# Patient Record
Sex: MALE | Race: WHITE
[De-identification: names, ages, dates, MRNs, and addresses within clinical notes are randomized per-mention and may not be internally consistent; named-entity substitution may affect disease eponyms.]

---

## 2017-01-23 PROBLEM — Z00.00 ENCOUNTER FOR PREVENTIVE HEALTH EXAMINATION: Status: ACTIVE | Noted: 2017-01-23

## 2017-02-13 ENCOUNTER — APPOINTMENT (OUTPATIENT)
Dept: HEMATOLOGY ONCOLOGY | Facility: CLINIC | Age: 67
End: 2017-02-13

## 2017-06-18 ENCOUNTER — OUTPATIENT (OUTPATIENT)
Dept: OUTPATIENT SERVICES | Facility: HOSPITAL | Age: 67
LOS: 1 days | Discharge: HOME | End: 2017-06-18

## 2017-06-28 DIAGNOSIS — R51 HEADACHE: ICD-10-CM

## 2017-08-15 ENCOUNTER — APPOINTMENT (OUTPATIENT)
Dept: SPINE | Facility: CLINIC | Age: 67
End: 2017-08-15
Payer: COMMERCIAL

## 2017-08-15 VITALS
HEIGHT: 69 IN | HEART RATE: 54 BPM | WEIGHT: 205 LBS | OXYGEN SATURATION: 96 % | SYSTOLIC BLOOD PRESSURE: 94 MMHG | DIASTOLIC BLOOD PRESSURE: 61 MMHG | BODY MASS INDEX: 30.36 KG/M2

## 2017-08-15 DIAGNOSIS — Z86.69 PERSONAL HISTORY OF OTHER DISEASES OF THE NERVOUS SYSTEM AND SENSE ORGANS: ICD-10-CM

## 2017-08-15 DIAGNOSIS — Z86.39 PERSONAL HISTORY OF OTHER ENDOCRINE, NUTRITIONAL AND METABOLIC DISEASE: ICD-10-CM

## 2017-08-15 DIAGNOSIS — M47.812 SPONDYLOSIS W/OUT MYELOPATHY OR RADICULOPATHY, CERVICAL REGION: ICD-10-CM

## 2017-08-15 DIAGNOSIS — G95.20 UNSPECIFIED CORD COMPRESSION: ICD-10-CM

## 2017-08-15 DIAGNOSIS — M25.78 OSTEOPHYTE, VERTEBRAE: ICD-10-CM

## 2017-08-15 DIAGNOSIS — Z95.5 PRESENCE OF CORONARY ANGIOPLASTY IMPLANT AND GRAFT: ICD-10-CM

## 2017-08-15 DIAGNOSIS — Z82.61 FAMILY HISTORY OF ARTHRITIS: ICD-10-CM

## 2017-08-15 DIAGNOSIS — R26.9 UNSPECIFIED ABNORMALITIES OF GAIT AND MOBILITY: ICD-10-CM

## 2017-08-15 DIAGNOSIS — Z87.39 PERSONAL HISTORY OF OTHER DISEASES OF THE MUSCULOSKELETAL SYSTEM AND CONNECTIVE TISSUE: ICD-10-CM

## 2017-08-15 DIAGNOSIS — M48.02 SPINAL STENOSIS, CERVICAL REGION: ICD-10-CM

## 2017-08-15 DIAGNOSIS — Z78.9 OTHER SPECIFIED HEALTH STATUS: ICD-10-CM

## 2017-08-15 DIAGNOSIS — Z86.711 PERSONAL HISTORY OF PULMONARY EMBOLISM: ICD-10-CM

## 2017-08-15 DIAGNOSIS — Z82.3 FAMILY HISTORY OF STROKE: ICD-10-CM

## 2017-08-15 DIAGNOSIS — Z87.438 PERSONAL HISTORY OF OTHER DISEASES OF MALE GENITAL ORGANS: ICD-10-CM

## 2017-08-15 DIAGNOSIS — Z87.19 PERSONAL HISTORY OF OTHER DISEASES OF THE DIGESTIVE SYSTEM: ICD-10-CM

## 2017-08-15 DIAGNOSIS — Z82.49 FAMILY HISTORY OF ISCHEMIC HEART DISEASE AND OTHER DISEASES OF THE CIRCULATORY SYSTEM: ICD-10-CM

## 2017-08-15 DIAGNOSIS — Z80.9 FAMILY HISTORY OF MALIGNANT NEOPLASM, UNSPECIFIED: ICD-10-CM

## 2017-08-15 PROCEDURE — 99204 OFFICE O/P NEW MOD 45 MIN: CPT

## 2017-08-18 PROBLEM — Z87.438 HISTORY OF BPH: Status: RESOLVED | Noted: 2017-08-18 | Resolved: 2017-08-18

## 2017-08-18 PROBLEM — Z86.69 HISTORY OF SLEEP APNEA: Status: RESOLVED | Noted: 2017-08-18 | Resolved: 2017-08-18

## 2017-08-18 PROBLEM — M48.02 LATERAL RECESS STENOSIS OF CERVICAL SPINE: Status: ACTIVE | Noted: 2017-08-18

## 2017-08-18 PROBLEM — Z80.9 FAMILY HISTORY OF MALIGNANT NEOPLASM: Status: ACTIVE | Noted: 2017-08-15

## 2017-08-18 PROBLEM — M47.812 SPONDYLOSIS, CERVICAL: Status: ACTIVE | Noted: 2017-08-18

## 2017-08-18 PROBLEM — Z86.711 HISTORY OF PULMONARY EMBOLISM: Status: RESOLVED | Noted: 2017-08-18 | Resolved: 2017-08-18

## 2017-08-18 PROBLEM — M25.78 CERVICAL OSTEOPHYTE: Status: ACTIVE | Noted: 2017-08-18

## 2017-08-18 PROBLEM — Z82.61 FAMILY HISTORY OF ARTHRITIS: Status: ACTIVE | Noted: 2017-08-15

## 2017-08-18 PROBLEM — Z86.39 HISTORY OF HYPERLIPIDEMIA: Status: RESOLVED | Noted: 2017-08-18 | Resolved: 2017-08-18

## 2017-08-18 PROBLEM — Z87.19 HISTORY OF GASTRITIS: Status: RESOLVED | Noted: 2017-08-18 | Resolved: 2017-08-18

## 2017-08-18 PROBLEM — G95.20 CERVICAL SPINAL CORD COMPRESSION: Status: ACTIVE | Noted: 2017-08-18

## 2017-08-18 PROBLEM — Z95.5 HISTORY OF HEART ARTERY STENT: Status: RESOLVED | Noted: 2017-08-15 | Resolved: 2017-08-18

## 2017-08-18 PROBLEM — R26.9 GAIT DISTURBANCE: Status: ACTIVE | Noted: 2017-08-18

## 2017-08-18 PROBLEM — Z87.39 HISTORY OF ARTHRITIS: Status: RESOLVED | Noted: 2017-08-18 | Resolved: 2017-08-18

## 2017-08-18 PROBLEM — Z82.49 FAMILY HISTORY OF HYPERTENSION: Status: ACTIVE | Noted: 2017-08-15

## 2017-08-18 PROBLEM — Z82.3 FAMILY HISTORY OF CEREBROVASCULAR ACCIDENT (CVA): Status: ACTIVE | Noted: 2017-08-15

## 2017-08-18 PROBLEM — Z78.9 DOES NOT USE ILLICIT DRUGS: Status: ACTIVE | Noted: 2017-08-15

## 2017-08-18 RX ORDER — METOPROLOL SUCCINATE 100 MG/1
100 TABLET, EXTENDED RELEASE ORAL
Refills: 0 | Status: ACTIVE | COMMUNITY

## 2017-08-18 RX ORDER — RANITIDINE HCL 150 MG
150 CAPSULE ORAL
Refills: 0 | Status: ACTIVE | COMMUNITY

## 2017-08-18 RX ORDER — DEXTROMETHORPHAN 30 MG/5ML
30 SUSPENSION, EXTENDED RELEASE ORAL
Refills: 0 | Status: ACTIVE | COMMUNITY

## 2017-08-18 RX ORDER — GABAPENTIN 400 MG/1
400 CAPSULE ORAL
Refills: 0 | Status: ACTIVE | COMMUNITY

## 2017-08-18 RX ORDER — CLOPIDOGREL 75 MG/1
75 TABLET, FILM COATED ORAL
Refills: 0 | Status: ACTIVE | COMMUNITY

## 2017-08-18 RX ORDER — ASPIRIN 81 MG
81 TABLET,CHEWABLE ORAL
Refills: 0 | Status: ACTIVE | COMMUNITY

## 2017-08-18 RX ORDER — FINASTERIDE 5 MG/1
5 TABLET, FILM COATED ORAL
Refills: 0 | Status: ACTIVE | COMMUNITY

## 2017-08-18 RX ORDER — TAMSULOSIN HYDROCHLORIDE 0.4 MG/1
0.4 CAPSULE ORAL
Refills: 0 | Status: ACTIVE | COMMUNITY

## 2017-08-18 RX ORDER — APIXABAN 5 MG/1
5 TABLET, FILM COATED ORAL
Refills: 0 | Status: ACTIVE | COMMUNITY

## 2017-08-18 RX ORDER — ATORVASTATIN CALCIUM 80 MG/1
80 TABLET, FILM COATED ORAL
Refills: 0 | Status: ACTIVE | COMMUNITY

## 2018-04-19 ENCOUNTER — OUTPATIENT (OUTPATIENT)
Dept: OUTPATIENT SERVICES | Facility: HOSPITAL | Age: 68
LOS: 1 days | Discharge: HOME | End: 2018-04-19

## 2018-04-19 DIAGNOSIS — D68.59 OTHER PRIMARY THROMBOPHILIA: ICD-10-CM

## 2018-10-21 ENCOUNTER — EMERGENCY (EMERGENCY)
Facility: HOSPITAL | Age: 68
LOS: 0 days | Discharge: HOME | End: 2018-10-21
Attending: STUDENT IN AN ORGANIZED HEALTH CARE EDUCATION/TRAINING PROGRAM | Admitting: EMERGENCY MEDICINE

## 2018-10-21 VITALS
TEMPERATURE: 98 F | OXYGEN SATURATION: 98 % | RESPIRATION RATE: 18 BRPM | HEART RATE: 75 BPM | SYSTOLIC BLOOD PRESSURE: 136 MMHG | DIASTOLIC BLOOD PRESSURE: 80 MMHG

## 2018-10-21 VITALS
TEMPERATURE: 98 F | HEART RATE: 73 BPM | DIASTOLIC BLOOD PRESSURE: 72 MMHG | RESPIRATION RATE: 18 BRPM | SYSTOLIC BLOOD PRESSURE: 124 MMHG | OXYGEN SATURATION: 95 %
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
## 2018-10-21 DIAGNOSIS — I25.10 ATHEROSCLEROTIC HEART DISEASE OF NATIVE CORONARY ARTERY WITHOUT ANGINA PECTORIS: ICD-10-CM

## 2018-10-21 DIAGNOSIS — M25.552 PAIN IN LEFT HIP: ICD-10-CM

## 2018-10-21 DIAGNOSIS — M25.562 PAIN IN LEFT KNEE: ICD-10-CM
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
## 2018-10-21 DIAGNOSIS — Z79.02 LONG TERM (CURRENT) USE OF ANTITHROMBOTICS/ANTIPLATELETS: ICD-10-CM

## 2018-10-21 DIAGNOSIS — Z95.5 PRESENCE OF CORONARY ANGIOPLASTY IMPLANT AND GRAFT: ICD-10-CM

## 2018-10-21 DIAGNOSIS — M79.652 PAIN IN LEFT THIGH: ICD-10-CM

## 2018-10-21 DIAGNOSIS — I10 ESSENTIAL (PRIMARY) HYPERTENSION: ICD-10-CM

## 2018-10-21 DIAGNOSIS — M25.559 PAIN IN UNSPECIFIED HIP: ICD-10-CM

## 2018-10-21 DIAGNOSIS — Z79.01 LONG TERM (CURRENT) USE OF ANTICOAGULANTS: ICD-10-CM

## 2018-10-21 LAB
ALBUMIN SERPL ELPH-MCNC: 3.8 G/DL — SIGNIFICANT CHANGE UP (ref 3.5–5.2)
ALP SERPL-CCNC: 79 U/L — SIGNIFICANT CHANGE UP (ref 30–115)
ALT FLD-CCNC: 18 U/L — SIGNIFICANT CHANGE UP (ref 0–41)
ANION GAP SERPL CALC-SCNC: 14 MMOL/L — SIGNIFICANT CHANGE UP (ref 7–14)
APTT BLD: 34.4 SEC — SIGNIFICANT CHANGE UP (ref 27–39.2)
AST SERPL-CCNC: 17 U/L — SIGNIFICANT CHANGE UP (ref 0–41)
BASOPHILS # BLD AUTO: 0.07 K/UL — SIGNIFICANT CHANGE UP (ref 0–0.2)
BASOPHILS NFR BLD AUTO: 0.6 % — SIGNIFICANT CHANGE UP (ref 0–1)
BILIRUB SERPL-MCNC: 0.8 MG/DL — SIGNIFICANT CHANGE UP (ref 0.2–1.2)
BLD GP AB SCN SERPL QL: SIGNIFICANT CHANGE UP
BUN SERPL-MCNC: 18 MG/DL — SIGNIFICANT CHANGE UP (ref 10–20)
CALCIUM SERPL-MCNC: 8.7 MG/DL — SIGNIFICANT CHANGE UP (ref 8.5–10.1)
CHLORIDE SERPL-SCNC: 103 MMOL/L — SIGNIFICANT CHANGE UP (ref 98–110)
CO2 SERPL-SCNC: 24 MMOL/L — SIGNIFICANT CHANGE UP (ref 17–32)
CREAT SERPL-MCNC: 0.9 MG/DL — SIGNIFICANT CHANGE UP (ref 0.7–1.5)
EOSINOPHIL # BLD AUTO: 0.15 K/UL — SIGNIFICANT CHANGE UP (ref 0–0.7)
EOSINOPHIL NFR BLD AUTO: 1.3 % — SIGNIFICANT CHANGE UP (ref 0–8)
GLUCOSE SERPL-MCNC: 88 MG/DL — SIGNIFICANT CHANGE UP (ref 70–99)
HCT VFR BLD CALC: 48.6 % — SIGNIFICANT CHANGE UP (ref 42–52)
HGB BLD-MCNC: 16.2 G/DL — SIGNIFICANT CHANGE UP (ref 14–18)
IMM GRANULOCYTES NFR BLD AUTO: 1.2 % — HIGH (ref 0.1–0.3)
INR BLD: 1.18 RATIO — SIGNIFICANT CHANGE UP (ref 0.65–1.3)
LYMPHOCYTES # BLD AUTO: 1.66 K/UL — SIGNIFICANT CHANGE UP (ref 1.2–3.4)
LYMPHOCYTES # BLD AUTO: 14.7 % — LOW (ref 20.5–51.1)
MCHC RBC-ENTMCNC: 32.1 PG — HIGH (ref 27–31)
MCHC RBC-ENTMCNC: 33.3 G/DL — SIGNIFICANT CHANGE UP (ref 32–37)
MCV RBC AUTO: 96.2 FL — HIGH (ref 80–94)
MONOCYTES # BLD AUTO: 1.54 K/UL — HIGH (ref 0.1–0.6)
MONOCYTES NFR BLD AUTO: 13.7 % — HIGH (ref 1.7–9.3)
NEUTROPHILS # BLD AUTO: 7.7 K/UL — HIGH (ref 1.4–6.5)
NEUTROPHILS NFR BLD AUTO: 68.5 % — SIGNIFICANT CHANGE UP (ref 42.2–75.2)
NRBC # BLD: 0 /100 WBCS — SIGNIFICANT CHANGE UP (ref 0–0)
PLATELET # BLD AUTO: 184 K/UL — SIGNIFICANT CHANGE UP (ref 130–400)
POTASSIUM SERPL-MCNC: 4.1 MMOL/L — SIGNIFICANT CHANGE UP (ref 3.5–5)
POTASSIUM SERPL-SCNC: 4.1 MMOL/L — SIGNIFICANT CHANGE UP (ref 3.5–5)
PROT SERPL-MCNC: 6.1 G/DL — SIGNIFICANT CHANGE UP (ref 6–8)
PROTHROM AB SERPL-ACNC: 13.5 SEC — HIGH (ref 9.95–12.87)
RBC # BLD: 5.05 M/UL — SIGNIFICANT CHANGE UP (ref 4.7–6.1)
RBC # FLD: 13.9 % — SIGNIFICANT CHANGE UP (ref 11.5–14.5)
SODIUM SERPL-SCNC: 141 MMOL/L — SIGNIFICANT CHANGE UP (ref 135–146)
TYPE + AB SCN PNL BLD: SIGNIFICANT CHANGE UP
WBC # BLD: 11.26 K/UL — HIGH (ref 4.8–10.8)
WBC # FLD AUTO: 11.26 K/UL — HIGH (ref 4.8–10.8)

## 2018-10-21 RX ORDER — ACETAMINOPHEN 500 MG
975 TABLET ORAL ONCE
Qty: 0 | Refills: 0 | Status: COMPLETED | OUTPATIENT
Start: 2018-10-21 | End: 2018-10-21

## 2018-10-21 RX ORDER — METHOCARBAMOL 500 MG/1
1000 TABLET, FILM COATED ORAL ONCE
Qty: 0 | Refills: 0 | Status: COMPLETED | OUTPATIENT
Start: 2018-10-21 | End: 2018-10-21

## 2018-10-21 RX ORDER — SODIUM CHLORIDE 9 MG/ML
1000 INJECTION, SOLUTION INTRAVENOUS ONCE
Qty: 0 | Refills: 0 | Status: COMPLETED | OUTPATIENT
Start: 2018-10-21 | End: 2018-10-21

## 2018-10-21 NOTE — ED ADULT NURSE NOTE - NSIMPLEMENTINTERV_GEN_ALL_ED
Implemented All Universal Safety Interventions:  [place] to call system. Call bell, personal items and telephone within reach. Instruct patient to call for assistance. Room bathroom lighting operational. Non-slip footwear when patient is off stretcher. Physically safe environment: no spills, clutter or unnecessary equipment. Stretcher in lowest position, wheels locked, appropriate side rails in place.

## 2018-10-21 NOTE — ED PROVIDER NOTE - OBJECTIVE STATEMENT
69y/o M w/ hx of CAD (stents 2 years ago),hx of stds on eloquis and plavix, hx htn, cholesetrol presents after fall yesterday at 6pm.pt was using walker and tripped fell to his l.side and back. no head injury no loc.  pt with L. leg pain, has been baring weight to leg,but with pain.  bruise to back of leg.pt able to range.

## 2018-10-21 NOTE — ED ADULT NURSE REASSESSMENT NOTE - NS ED NURSE REASSESS COMMENT FT1
Pt agitated because he states he would like to eat and states he has been waiting for tests to be done. However, pt was educated and made aware that he was to go for CT scan and to be NPO. Pt in CT scan at this time. Safety and comfort maintained. will cont to monitor.

## 2018-10-21 NOTE — ED ADULT NURSE REASSESSMENT NOTE - NS ED NURSE REASSESS COMMENT FT1
Received pt awake , A&Ox3 . Family members at bed side. pt reports moderate L hip pain. Awaiting for x-ray results. Safety maintained, Will continue to monitor

## 2018-10-21 NOTE — ED PROVIDER NOTE - ATTENDING CONTRIBUTION TO CARE
67 y/o M pmh htn, spinal stenosis, hx dvt on eliquis, p/w L thigh pain s/p mechanical fall today.  no head injury, LOC, weakness or numbness, bowel or bladder problems.      CONSTITUTIONAL: NAD  SKIN: Warm dry  HEAD: NCAT  EYES: NL inspection  ENT: MMM  NECK: Supple; non tender.  CARD: RRR  RESP: CTAB  ABD: S/NT no R/G  EXT: LLE: NL length, + ttp lateral thigh w/o deformity; NL ROM throughout, but pain to knee  and hip w/ extreme of ROM; NL pulses  NEURO: Grossly unremarkable, NL sensory throughout; motor limited on LLE 2/2 pain, o/w NL throughout.  PSYCH: Cooperative, appropriate.    IMP: LE frx vs contusion.  P: labs, xray imaging, analgesia, reassess.

## 2018-10-21 NOTE — ED PROVIDER NOTE - PHYSICAL EXAMINATION
VITAL SIGNS: I have reviewed nursing notes and confirm.  CONSTITUTIONAL: Well-developed; well-nourished; in no acute distress. pt comfortable.  SKIN: skin exam is warm and dry, [id number] ecchymosis to back of femur.  HEAD: Normocephalic; atraumatic.  EYES:  EOM intact; conjunctiva and sclera clear.  ENT: No nasal discharge; airway clear. moist oral mucosa; uvula at midline.   NECK: Supple; non tender.  CARD: S1, S2 normal; no murmurs, gallops, or rubs. Regular rate and rhythm. posterior tibial and radial pulses 2+  RESP: No wheezes, rales or rhonchi. cta b/l. no use of accessory muscles. no retractions  ABD: Normal bowel sounds; soft; non-distended; non-tender; no rebound.  EXT: Normal ROM. No  cyanosis or edema. tenderness to palpation to L. femur and L. knee.  BACK: No midline tenderness.  LYMPH: No acute cervical adenopathy.  NEURO: Alert, oriented, grossly unremarkable.    PSYCH: Cooperative, appropriate.

## 2018-10-21 NOTE — ED PROVIDER NOTE - MEDICAL DECISION MAKING DETAILS
68yoM HTN DLD DVT on eliquis presents with L hip pain after a fall.  Xray, CT w/o fracture. Pt reassessed, able to ambulate, interested in going home. Recommend PCP f/u if still in pain, consider PT, safety assessment at home to prevent further falls.

## 2018-10-21 NOTE — ED ADULT NURSE NOTE - CHPI ED NUR SYMPTOMS NEG
no fever/no abrasion/no weakness/no vomiting/no confusion/no deformity/no bleeding/no loss of consciousness/no numbness/no tingling

## 2018-10-21 NOTE — ED ADULT TRIAGE NOTE - CHIEF COMPLAINT QUOTE
Pt BIBA from home complaining of L leg and hip pain s/p mechanical trip and fall yesterday. Pt on eliquis. Denies head injury. Pt was able to ambulate with difficulty.

## 2018-10-21 NOTE — ED ADULT NURSE REASSESSMENT NOTE - NS ED NURSE REASSESS COMMENT FT1
pt returned from CT, tolerated well. requesting food . pt was given Kosher food as pt was cleared by Dr. William to eat

## 2018-10-21 NOTE — CONSULT NOTE ADULT - SUBJECTIVE AND OBJECTIVE BOX
TRAUMA ACTIVATION LEVEL:  Consult    MECHANISM OF INJURY: [] Fall	  GCS: 	E: 4	V: 5	M: 6      HPI: 67 y/o M, w/ PMH of PE's, stents (on Eliquis & Plavix), spinal stenosis, HTN, HLD, and BPH, presented to ED s/p fall. Pt states he was walking with a cane and fell on his L side while walking on the sidewalk; denies hitting his head. Pt admits to baseline instability while walking due to his spinal stenosis, and says he's had multiple falls in the past. Pt complains of L leg pain. Denies other complaints/pain, LOC, CP, SOB, abd pain, dizziness, changes in vision, HA, fever/chills, n/v.  -LOC, +AC, -HT.      PAST MEDICAL & SURGICAL HISTORY:  BPH (benign prostatic hyperplasia)  Spinal stenosis  Hyperlipidemia  Hypertension      Allergies    No Known Allergies    Intolerances        Home Medications:  Eliquis 5 mg oral tablet: 1 tab(s) orally 2 times a day (21 Oct 2018 12:49)  finasteride: orally once a day (21 Oct 2018 12:49)  Flomax 0.4 mg oral capsule: 1 cap(s) orally once a day (21 Oct 2018 12:49)  gabapentin 400 mg oral capsule: orally 2 times a day (21 Oct 2018 12:49)  Lipitor 80 mg oral tablet: 1 tab(s) orally once a day (21 Oct 2018 12:49)  metoprolol tartrate 100 mg oral tablet: orally once a day (21 Oct 2018 12:49)  Plavix 75 mg oral tablet: 1 tab(s) orally once a day (21 Oct 2018 12:49)      ROS: 10-system review is otherwise negative except HPI above.      Primary Survey:    A - airway intact  B - bilateral breath sounds and good chest rise  C - palpable pulses in all extremities  D - GCS 15 on arrival, DEVLIN  Exposure obtained    Vital Signs Last 24 Hrs  T(C): 36.7 (21 Oct 2018 09:55), Max: 36.7 (21 Oct 2018 09:55)  T(F): 98 (21 Oct 2018 09:55), Max: 98 (21 Oct 2018 09:55)  HR: 73 (21 Oct 2018 09:55) (73 - 73)  BP: 124/72 (21 Oct 2018 09:55) (124/72 - 124/72)  BP(mean): --  RR: 18 (21 Oct 2018 09:55) (18 - 18)  SpO2: 95% (21 Oct 2018 09:55) (95% - 95%)    Secondary Survey:   General: NAD  HEENT: Normocephalic, atraumatic, EOMI, PEERLA. no scalp lacerations   Neck: Soft, midline trachea.  Chest: No chest wall tenderness.    Cardiac: S1, S2, RRR  Respiratory: Bilateral breath sounds, clear and equal bilaterally  Abdomen: Soft, nondistended, nontender, no rebound        LABS:  Labs:  CAPILLARY BLOOD GLUCOSE                              16.2   11.26 )-----------( 184      ( 21 Oct 2018 10:27 )             48.6       Auto Neutrophil %: 68.5 % (10-21-18 @ 10:27)  Auto Immature Granulocyte %: 1.2 % (10-21-18 @ 10:27)    10-21    141  |  103  |  18  ----------------------------<  88  4.1   |  24  |  0.9      Calcium, Total Serum: 8.7 mg/dL (10-21-18 @ 10:27)      LFTs:             6.1  | 0.8  | 17       ------------------[79      ( 21 Oct 2018 10:27 )  3.8  | x    | 18          Lipase:x      Amylase:x             Coags:     13.50  ----< 1.18    ( 21 Oct 2018 10:27 )     34.4                        RADIOLOGY & ADDITIONAL STUDIES:    pending TRAUMA ACTIVATION LEVEL:  Consult    MECHANISM OF INJURY: [] Fall	  GCS: 	E: 4	V: 5	M: 6      HPI: 69 y/o M, w/ PMH of PE's, stents (on Eliquis & Plavix), spinal stenosis, HTN, HLD, and BPH, presented to ED s/p fall. Pt states he was walking with a cane and fell on his L side while walking on the sidewalk; denies hitting his head. Pt admits to baseline instability while walking due to his spinal stenosis, and says he's had multiple falls in the past. Pt complains of L leg pain. Denies other complaints/pain, LOC, CP, SOB, abd pain, dizziness, changes in vision, HA, fever/chills, n/v.  -LOC, +AC, -HT.      PAST MEDICAL & SURGICAL HISTORY:  BPH (benign prostatic hyperplasia)  Spinal stenosis  Hyperlipidemia  Hypertension      Allergies    No Known Allergies    Intolerances        Home Medications:  Eliquis 5 mg oral tablet: 1 tab(s) orally 2 times a day (21 Oct 2018 12:49)  finasteride: orally once a day (21 Oct 2018 12:49)  Flomax 0.4 mg oral capsule: 1 cap(s) orally once a day (21 Oct 2018 12:49)  gabapentin 400 mg oral capsule: orally 2 times a day (21 Oct 2018 12:49)  Lipitor 80 mg oral tablet: 1 tab(s) orally once a day (21 Oct 2018 12:49)  metoprolol tartrate 100 mg oral tablet: orally once a day (21 Oct 2018 12:49)  Plavix 75 mg oral tablet: 1 tab(s) orally once a day (21 Oct 2018 12:49)      ROS: 10-system review is otherwise negative except HPI above.      Primary Survey:    A - airway intact  B - bilateral breath sounds and good chest rise  C - palpable pulses in all extremities  D - GCS 15 on arrival, DEVLIN  Exposure obtained    Vital Signs Last 24 Hrs  T(C): 36.7 (21 Oct 2018 09:55), Max: 36.7 (21 Oct 2018 09:55)  T(F): 98 (21 Oct 2018 09:55), Max: 98 (21 Oct 2018 09:55)  HR: 73 (21 Oct 2018 09:55) (73 - 73)  BP: 124/72 (21 Oct 2018 09:55) (124/72 - 124/72)  BP(mean): --  RR: 18 (21 Oct 2018 09:55) (18 - 18)  SpO2: 95% (21 Oct 2018 09:55) (95% - 95%)    Secondary Survey:   General: NAD  HEENT: Normocephalic, atraumatic, EOMI, PEERLA. no scalp lacerations   Neck: Soft, midline trachea.  Chest: No chest wall tenderness.    Cardiac: S1, S2, RRR  Respiratory: Bilateral breath sounds, clear and equal bilaterally  Abdomen: Soft, nondistended, nontender, no rebound        LABS:  Labs:  CAPILLARY BLOOD GLUCOSE                              16.2   11.26 )-----------( 184      ( 21 Oct 2018 10:27 )             48.6       Auto Neutrophil %: 68.5 % (10-21-18 @ 10:27)  Auto Immature Granulocyte %: 1.2 % (10-21-18 @ 10:27)    10-21    141  |  103  |  18  ----------------------------<  88  4.1   |  24  |  0.9      Calcium, Total Serum: 8.7 mg/dL (10-21-18 @ 10:27)      LFTs:             6.1  | 0.8  | 17       ------------------[79      ( 21 Oct 2018 10:27 )  3.8  | x    | 18          Lipase:x      Amylase:x             Coags:     13.50  ----< 1.18    ( 21 Oct 2018 10:27 )     34.4                        RADIOLOGY & ADDITIONAL STUDIES:  < from: CT Head No Cont (10.21.18 @ 15:19) >    IMPRESSION:     No evidence of acute intracranial pathology.      < end of copied text >  < from: CT Cervical Spine No Cont (10.21.18 @ 15:19) >  IMPRESSION:    No evidence of a cervical spine fracture or subluxation.    < end of copied text >  < from: CT Abdomen and Pelvis w/ IV Cont (10.21.18 @ 15:21) >  IMPRESSION:        No evidence of acute traumatic injury within the abdomen or pelvis.    4 mm left lower lobe pulmonary nodule. In a high-risk patient CT chest   may be obtained in 6-12 months.    < end of copied text >  < from: Xray Ankle 2 Views, Left (10.21.18 @ 11:09) >  Findings/  Impression:    No evidence of acute fracture or dislocation. Alignment is normal. Status   post fixation of previously noted left distal fibular fracture, with   orthopedic hardware in place. No evidence of inadvertent radiopaque   foreign body.    < end of copied text >  < from: Xray Tibia + Fibula 2 Views, Left (10.21.18 @ 11:09) >    No evidence of acute fracture or dislocation. Alignment is normal. Status   post fixation of previously noted left distal fibular fracture, with   orthopedic hardware in place. No evidence of inadvertent radiopaque   foreign body.    < end of copied text >  < from: Xray Knee 3 Views, Left (10.21.18 @ 11:08) >  Impression:    No evidence of acute osseous abnormality.    < end of copied text >  < from: Xray Femur 2 Views, Left (10.21.18 @ 11:08) >  Impression:    No evidence of acute osseous abnormality.    < end of copied text >

## 2018-10-21 NOTE — CONSULT NOTE ADULT - ASSESSMENT
ASSESSMENT:   69y/o M, w/ PMH of PEs and stents (on Eliquis and Plavix) s/p mechanical fall complaining of L leg pain; +AC, -LOC, -HT    PLAN:    - f/u X/R  - f/u CT ASSESSMENT:   67y/o M, w/ PMH of PEs and stents (on Eliquis and Plavix) s/p mechanical fall complaining of L leg pain; +AC, -LOC, -HT    PLAN:    - f/u X/R  - f/u CT    Senior Trauma Resident Note  Airway intact  Bilateral Breath Sounds  Palpable pulses in 4 ext  GCS 15, PERRL, DEVLIN  VSS  No Subq emphysema, abdominal tenderness,  or pelvic instability   CXR and PXR negative  Ct findings above  Will Dispo accordingly  Plan as above d/w Dr Bimal Dawkins

## 2018-10-21 NOTE — SBIRT NOTE. - NSSBIRTSERVICES_GEN_A_ED_FT
Provided SBIRT services: Full Screen Negative    Positive reinforcement provided given patient currently within healthy guidelines. Education materials reviewed and given to patient.    AUDIT Score: 0  DAST-10 Score: 0  Duration = 5 Minutes

## 2018-12-28 ENCOUNTER — EMERGENCY (EMERGENCY)
Facility: HOSPITAL | Age: 68
LOS: 0 days | Discharge: HOME | End: 2018-12-28
Attending: EMERGENCY MEDICINE | Admitting: EMERGENCY MEDICINE

## 2018-12-28 VITALS
SYSTOLIC BLOOD PRESSURE: 130 MMHG | TEMPERATURE: 99 F | OXYGEN SATURATION: 98 % | DIASTOLIC BLOOD PRESSURE: 76 MMHG | RESPIRATION RATE: 18 BRPM | HEART RATE: 67 BPM

## 2018-12-28 VITALS
RESPIRATION RATE: 18 BRPM | DIASTOLIC BLOOD PRESSURE: 68 MMHG | HEART RATE: 68 BPM | SYSTOLIC BLOOD PRESSURE: 114 MMHG | TEMPERATURE: 97 F | OXYGEN SATURATION: 97 %
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
## 2018-12-28 DIAGNOSIS — S30.1XXA CONTUSION OF ABDOMINAL WALL, INITIAL ENCOUNTER: ICD-10-CM

## 2018-12-28 DIAGNOSIS — Z79.01 LONG TERM (CURRENT) USE OF ANTICOAGULANTS: ICD-10-CM

## 2018-12-28 DIAGNOSIS — I25.10 ATHEROSCLEROTIC HEART DISEASE OF NATIVE CORONARY ARTERY WITHOUT ANGINA PECTORIS: ICD-10-CM

## 2018-12-28 DIAGNOSIS — I10 ESSENTIAL (PRIMARY) HYPERTENSION: ICD-10-CM
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
## 2018-12-28 DIAGNOSIS — S00.03XA CONTUSION OF SCALP, INITIAL ENCOUNTER: ICD-10-CM

## 2018-12-28 DIAGNOSIS — Z79.02 LONG TERM (CURRENT) USE OF ANTITHROMBOTICS/ANTIPLATELETS: ICD-10-CM
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
## 2018-12-28 DIAGNOSIS — E78.5 HYPERLIPIDEMIA, UNSPECIFIED: ICD-10-CM

## 2018-12-28 DIAGNOSIS — S09.90XA UNSPECIFIED INJURY OF HEAD, INITIAL ENCOUNTER: ICD-10-CM

## 2018-12-28 PROBLEM — M48.00 SPINAL STENOSIS, SITE UNSPECIFIED: Chronic | Status: ACTIVE | Noted: 2018-10-21

## 2018-12-28 PROBLEM — N40.0 BENIGN PROSTATIC HYPERPLASIA WITHOUT LOWER URINARY TRACT SYMPTOMS: Chronic | Status: ACTIVE | Noted: 2018-10-21

## 2018-12-28 LAB
ALBUMIN SERPL ELPH-MCNC: 4 G/DL — SIGNIFICANT CHANGE UP (ref 3.5–5.2)
ALP SERPL-CCNC: 72 U/L — SIGNIFICANT CHANGE UP (ref 30–115)
ALT FLD-CCNC: 20 U/L — SIGNIFICANT CHANGE UP (ref 0–41)
ANION GAP SERPL CALC-SCNC: 17 MMOL/L — HIGH (ref 7–14)
APTT BLD: 24.8 SEC — LOW (ref 27–39.2)
AST SERPL-CCNC: 72 U/L — HIGH (ref 0–41)
BASE EXCESS BLDV CALC-SCNC: -0.3 MMOL/L — SIGNIFICANT CHANGE UP (ref -2–2)
BASOPHILS # BLD AUTO: 0.09 K/UL — SIGNIFICANT CHANGE UP (ref 0–0.2)
BASOPHILS NFR BLD AUTO: 1 % — SIGNIFICANT CHANGE UP (ref 0–1)
BILIRUB SERPL-MCNC: 0.7 MG/DL — SIGNIFICANT CHANGE UP (ref 0.2–1.2)
BUN SERPL-MCNC: 14 MG/DL — SIGNIFICANT CHANGE UP (ref 10–20)
CA-I SERPL-SCNC: 1.23 MMOL/L — SIGNIFICANT CHANGE UP (ref 1.12–1.3)
CALCIUM SERPL-MCNC: 8.7 MG/DL — SIGNIFICANT CHANGE UP (ref 8.5–10.1)
CHLORIDE SERPL-SCNC: 103 MMOL/L — SIGNIFICANT CHANGE UP (ref 98–110)
CO2 SERPL-SCNC: 17 MMOL/L — SIGNIFICANT CHANGE UP (ref 17–32)
CREAT SERPL-MCNC: 1.1 MG/DL — SIGNIFICANT CHANGE UP (ref 0.7–1.5)
EOSINOPHIL # BLD AUTO: 0.17 K/UL — SIGNIFICANT CHANGE UP (ref 0–0.7)
EOSINOPHIL NFR BLD AUTO: 1.9 % — SIGNIFICANT CHANGE UP (ref 0–8)
ETHANOL SERPL-MCNC: <10 MG/DL — HIGH
GAS PNL BLDV: 140 MMOL/L — SIGNIFICANT CHANGE UP (ref 136–145)
GAS PNL BLDV: SIGNIFICANT CHANGE UP
GLUCOSE SERPL-MCNC: 99 MG/DL — SIGNIFICANT CHANGE UP (ref 70–99)
HCO3 BLDV-SCNC: 26 MMOL/L — SIGNIFICANT CHANGE UP (ref 22–29)
HCT VFR BLD CALC: 47.5 % — SIGNIFICANT CHANGE UP (ref 42–52)
HCT VFR BLDA CALC: 53.8 % — HIGH (ref 34–44)
HGB BLD CALC-MCNC: 17.5 G/DL — SIGNIFICANT CHANGE UP (ref 14–18)
HGB BLD-MCNC: 16.4 G/DL — SIGNIFICANT CHANGE UP (ref 14–18)
IMM GRANULOCYTES NFR BLD AUTO: 1.7 % — HIGH (ref 0.1–0.3)
INR BLD: 1.04 RATIO — SIGNIFICANT CHANGE UP (ref 0.65–1.3)
LACTATE BLDV-MCNC: 1.6 MMOL/L — SIGNIFICANT CHANGE UP (ref 0.5–1.6)
LACTATE SERPL-SCNC: 1.5 MMOL/L — SIGNIFICANT CHANGE UP (ref 0.5–2.2)
LIDOCAIN IGE QN: 31 U/L — SIGNIFICANT CHANGE UP (ref 7–60)
LYMPHOCYTES # BLD AUTO: 1.59 K/UL — SIGNIFICANT CHANGE UP (ref 1.2–3.4)
LYMPHOCYTES # BLD AUTO: 17.9 % — LOW (ref 20.5–51.1)
MCHC RBC-ENTMCNC: 32.5 PG — HIGH (ref 27–31)
MCHC RBC-ENTMCNC: 34.5 G/DL — SIGNIFICANT CHANGE UP (ref 32–37)
MCV RBC AUTO: 94.2 FL — HIGH (ref 80–94)
MONOCYTES # BLD AUTO: 1.09 K/UL — HIGH (ref 0.1–0.6)
MONOCYTES NFR BLD AUTO: 12.3 % — HIGH (ref 1.7–9.3)
NEUTROPHILS # BLD AUTO: 5.78 K/UL — SIGNIFICANT CHANGE UP (ref 1.4–6.5)
NEUTROPHILS NFR BLD AUTO: 65.2 % — SIGNIFICANT CHANGE UP (ref 42.2–75.2)
PCO2 BLDV: 46 MMHG — SIGNIFICANT CHANGE UP (ref 41–51)
PH BLDV: 7.36 — SIGNIFICANT CHANGE UP (ref 7.26–7.43)
PLATELET # BLD AUTO: 99 K/UL — LOW (ref 130–400)
PO2 BLDV: 27 MMHG — SIGNIFICANT CHANGE UP (ref 20–40)
POTASSIUM BLDV-SCNC: 4.1 MMOL/L — SIGNIFICANT CHANGE UP (ref 3.3–5.6)
POTASSIUM SERPL-MCNC: 7.1 MMOL/L — CRITICAL HIGH (ref 3.5–5)
POTASSIUM SERPL-SCNC: 7.1 MMOL/L — CRITICAL HIGH (ref 3.5–5)
PROT SERPL-MCNC: 7.1 G/DL — SIGNIFICANT CHANGE UP (ref 6–8)
PROTHROM AB SERPL-ACNC: 12 SEC — SIGNIFICANT CHANGE UP (ref 9.95–12.87)
RBC # BLD: 5.04 M/UL — SIGNIFICANT CHANGE UP (ref 4.7–6.1)
RBC # FLD: SIGNIFICANT CHANGE UP % (ref 11.5–14.5)
SAO2 % BLDV: 50 % — SIGNIFICANT CHANGE UP
SODIUM SERPL-SCNC: 137 MMOL/L — SIGNIFICANT CHANGE UP (ref 135–146)
WBC # BLD: 8.87 K/UL — SIGNIFICANT CHANGE UP (ref 4.8–10.8)
WBC # FLD AUTO: 8.87 K/UL — SIGNIFICANT CHANGE UP (ref 4.8–10.8)

## 2018-12-28 RX ORDER — SODIUM CHLORIDE 9 MG/ML
1000 INJECTION INTRAMUSCULAR; INTRAVENOUS; SUBCUTANEOUS ONCE
Qty: 0 | Refills: 0 | Status: COMPLETED | OUTPATIENT
Start: 2018-12-28 | End: 2018-12-28

## 2018-12-28 RX ADMIN — SODIUM CHLORIDE 1000 MILLILITER(S): 9 INJECTION INTRAMUSCULAR; INTRAVENOUS; SUBCUTANEOUS at 06:38

## 2018-12-28 NOTE — ED PROVIDER NOTE - OBJECTIVE STATEMENT
67 y/o male with pmhx of CAD on eliquis and plavix, HTN, DLD presents s/p fall. Patient states he rolled off a bed that is about 2-3 feet high while sleeping, +head trauma onto a plastic piece near his bed. Patient admits to waking up after rolling off. Denies acute injury after fall. Denies sob, chest pain, n/v/d, abdominal pain. 67 y/o male with pmhx of CAD on plavix, PE on eliquis, HTN, DLD presents s/p fall. Patient states he rolled off a bed that is about 2-3 feet high while sleeping, +head trauma onto a plastic piece near his bed. Patient admits to waking up after rolling off. Denies acute injury after fall. Denies sob, chest pain, n/v/d, abdominal pain.

## 2018-12-28 NOTE — ED PROVIDER NOTE - NS ED ROS FT
Constitutional: See HPI.  Eyes: No visual changes, eye pain or discharge.  ENMT: No hearing changes, pain, discharge or infections. No neck pain or stiffness.  Cardiac: No chest pain, SOB or edema. No chest pain with exertion.  Respiratory: No cough or respiratory distress.   GI: No nausea, vomiting, diarrhea or abdominal pain.  : No dysuria, frequency or burning.  MS: No myalgia, muscle weakness, joint pain or back pain.  Neuro: No headache or weakness. No LOC.  Skin: No skin rash.  Endo: + hx of DM; No thyroid disease  Except as documented in HPI, all other review of systems is negative

## 2018-12-28 NOTE — ED PROVIDER NOTE - PHYSICAL EXAMINATION
CONSTITUTIONAL: Well-developed; well-nourished; in no acute distress.   SKIN: warm, dry  HEAD: +3cm abrasion to posterior occiput, no active bleeding noted   EYES: no gross trauma bilaterally, no proptosis  ENT: No nasal discharge; airway clear. no racoon eyes no flores sign  NECK: no midline tenderness, normal ROM  CHEST: no crepitus or bruising  CARD: S1, S2 normal; no murmurs, gallops, or rubs. Regular rate and rhythm.   RESP: No wheezes, rales or rhonchi.  ABD: soft ntnd  BACK: no midline tenderness or step offs  PELVIS: no laxity with lateral compression  EXT: no gross extremity injury  NEURO: gcs 15, moving all extremities grossly, following commands  PSYCH: Cooperative, appropriate

## 2018-12-28 NOTE — ED PROVIDER NOTE - MEDICAL DECISION MAKING DETAILS
PT AWARE OF ALL LABS AND IMAGING, WITH COPY GIVEN, UNDERSTANDS SIGNS AND SYMPTOMS TO RETURN FOR, FEELING BETTER, GCS 15, CLEARED BY TRAUMA, WILL FOLLOW UP AS DISCUSSED.

## 2018-12-28 NOTE — CONSULT NOTE ADULT - SUBJECTIVE AND OBJECTIVE BOX
MAURA RODRIGUEZ [id number]  68y Male PMH below notable for recurrent PE on eliquis and CAD s/p Stent on plavix presented s/p fall out of bed. Patient was sleeping and woke up after he fell out of bed onto the floor (2-3 feet), patient does not recall if he hit his head or lost consciousness States this has never happened to him before but that he has had recent fall (poor balance secondary to spinal stenosis). Only complaining of back pain (has at baseline) and headache.    PAST MEDICAL & SURGICAL HISTORY:  BPH (benign prostatic hyperplasia)  Spinal stenosis  Hyperlipidemia  Hypertension  PE  Spinal stenosis  CAD /p stent  arthritis  2x LE orthopedic surgeries        MEDICATIONS  (STANDING): None      Allergies    No Known Allergies    Intolerances        REVIEW OF SYSTEMS    [X] A ten-point review of systems was otherwise negative except as noted.  [ ] Due to altered mental status/intubation, subjective information were not able to be obtained from the patient. History was obtained, to the extent possible, from review of the chart and collateral sources of information.      Vital Signs Last 24 Hrs  T(C): 37 (28 Dec 2018 07:40), Max: 37 (28 Dec 2018 07:40)  T(F): 98.6 (28 Dec 2018 07:40), Max: 98.6 (28 Dec 2018 07:40)  HR: 67 (28 Dec 2018 07:40) (67 - 68)  BP: 130/76 (28 Dec 2018 07:40) (114/68 - 130/76)  BP(mean): --  RR: 18 (28 Dec 2018 07:40) (18 - 18)  SpO2: 98% (28 Dec 2018 07:40) (97% - 98%)    PHYSICAL EXAM:  GENERAL: NAD, well-appearing, c-collar in place  CHEST/LUNG: Clear to auscultation bilaterally  HEART: Regular rate and rhythm  ABDOMEN: Soft, Nontender, Nondistended, LLQ bruise in place - old as per patients wife  EXTREMITIES:  No clubbing, cyanosis, or edema      LABS:  Labs:  CAPILLARY BLOOD GLUCOSE      POCT Blood Glucose.: 90 mg/dL (28 Dec 2018 06:09)                          16.4   8.87  )-----------( 99       ( 28 Dec 2018 05:57 )             47.5       Auto Neutrophil %: 65.2 % (12-28-18 @ 05:57)  Auto Immature Granulocyte %: 1.7 % (12-28-18 @ 05:57)    12-28    137  |  103  |  14  ----------------------------<  99  7.1<HH>   |  17  |  1.1      Calcium, Total Serum: 8.7 mg/dL (12-28-18 @ 05:57)      LFTs:             7.1  | 0.7  | 72       ------------------[72      ( 28 Dec 2018 05:57 )  4.0  | x    | 20          Lipase:31     Amylase:x         Blood Gas Venous - Lactate: 1.6 mmoL/L (12-28-18 @ 08:28)  Lactate, Blood: 1.5 mmol/L (12-28-18 @ 05:57)      Coags:     12.00  ----< 1.04    ( 28 Dec 2018 05:57 )     24.8          RADIOLOGY & ADDITIONAL STUDIES:  < from: CT Abdomen and Pelvis w/ IV Cont (12.28.18 @ 08:49) >  1.  No CT evidence for acute intrathoracic or abdominopelvic pathology.     < end of copied text >    < from: CT Cervical Spine No Cont (12.28.18 @ 08:46) >  No evidence of acute cervical spine injury.    < end of copied text >  < from: CT Head No Cont (12.28.18 @ 08:35) >    No mass effect or intracranial hemorrhage.     < end of copied text >    < from: Xray Pelvis AP only (12.28.18 @ 06:34) >    No definite acute osseous abnormality.    < end of copied text >    < from: Xray Chest 1 View AP/PA (12.28.18 @ 06:33) >  Dependent atelectasis.    Follow-up as needed.      < end of copied text >

## 2018-12-28 NOTE — ED PROVIDER NOTE - PROGRESS NOTE DETAILS
cleared by trauma pt has been resting comfortably in nad, reports no symptoms, would like to go home, cleared off c-collar, gcs 15,. aware of all labs and imaging, understands signs and symptoms to return for, follow up with pmd and rehab as discussed.

## 2018-12-28 NOTE — ED ADULT NURSE REASSESSMENT NOTE - NS ED NURSE REASSESS COMMENT FT1
patient maintained on soft c-collar. family at bedside. plan of care discussed. vital signs stable. awaiting CT scan result. denies any discomfort at this time.

## 2018-12-28 NOTE — ED PROVIDER NOTE - ATTENDING CONTRIBUTION TO CARE
s/p fall from bed, on plavix and eliquis, hit head, no loc. no neck pain. no cp or sob. no ab pain. pt in nad, abrasion to post scalp. neck nt, ctab, chest nt. rrr, ab soft, nt. ecchymosis to Left abdomen. hips nt. extremities atraumatic. non focal. gcs 15. trauma  alert called. will image, labs.

## 2018-12-28 NOTE — ED PROVIDER NOTE - SHIFT CHANGE DETAILS
A 69 y/o m w/ pmhx of cad on plavix, PE on eliquis presents s/p fall where pt reports he was sleeping and rolled off bed, (+)  head trauma, woke up immediately after he fell, gcs 15, trauma alert called, pan scan, pending imaging, pt aware, in c-collar, seen by trauma, will continue to monitor and reassess.

## 2019-02-03 ENCOUNTER — OUTPATIENT (OUTPATIENT)
Dept: OUTPATIENT SERVICES | Facility: HOSPITAL | Age: 69
LOS: 1 days | Discharge: HOME | End: 2019-02-03

## 2019-02-03 DIAGNOSIS — S60.221A CONTUSION OF RIGHT HAND, INITIAL ENCOUNTER: ICD-10-CM

## 2019-11-29 ENCOUNTER — INPATIENT (INPATIENT)
Facility: HOSPITAL | Age: 69
LOS: 0 days | Discharge: HOME | End: 2019-11-30
Attending: SURGERY | Admitting: SURGERY
Payer: COMMERCIAL

## 2019-11-29 VITALS
OXYGEN SATURATION: 98 % | DIASTOLIC BLOOD PRESSURE: 88 MMHG | SYSTOLIC BLOOD PRESSURE: 129 MMHG | HEART RATE: 89 BPM | TEMPERATURE: 98 F | RESPIRATION RATE: 21 BRPM

## 2019-11-29 DIAGNOSIS — Z95.5 PRESENCE OF CORONARY ANGIOPLASTY IMPLANT AND GRAFT: Chronic | ICD-10-CM

## 2019-11-29 DIAGNOSIS — R09.89 OTHER SPECIFIED SYMPTOMS AND SIGNS INVOLVING THE CIRCULATORY AND RESPIRATORY SYSTEMS: ICD-10-CM

## 2019-11-29 DIAGNOSIS — I26.99 OTHER PULMONARY EMBOLISM WITHOUT ACUTE COR PULMONALE: ICD-10-CM

## 2019-11-29 LAB
ALBUMIN SERPL ELPH-MCNC: 4.2 G/DL — SIGNIFICANT CHANGE UP (ref 3.5–5.2)
ALP SERPL-CCNC: 94 U/L — SIGNIFICANT CHANGE UP (ref 30–115)
ALT FLD-CCNC: 22 U/L — SIGNIFICANT CHANGE UP (ref 0–41)
ANION GAP SERPL CALC-SCNC: 13 MMOL/L — SIGNIFICANT CHANGE UP (ref 7–14)
ANION GAP SERPL CALC-SCNC: 15 MMOL/L — HIGH (ref 7–14)
APTT BLD: 28.5 SEC — SIGNIFICANT CHANGE UP (ref 27–39.2)
AST SERPL-CCNC: 23 U/L — SIGNIFICANT CHANGE UP (ref 0–41)
BASOPHILS # BLD AUTO: 0.09 K/UL — SIGNIFICANT CHANGE UP (ref 0–0.2)
BASOPHILS # BLD AUTO: 0.11 K/UL — SIGNIFICANT CHANGE UP (ref 0–0.2)
BASOPHILS NFR BLD AUTO: 0.8 % — SIGNIFICANT CHANGE UP (ref 0–1)
BASOPHILS NFR BLD AUTO: 1.1 % — HIGH (ref 0–1)
BILIRUB SERPL-MCNC: 0.6 MG/DL — SIGNIFICANT CHANGE UP (ref 0.2–1.2)
BUN SERPL-MCNC: 12 MG/DL — SIGNIFICANT CHANGE UP (ref 10–20)
BUN SERPL-MCNC: 16 MG/DL — SIGNIFICANT CHANGE UP (ref 10–20)
CALCIUM SERPL-MCNC: 8.6 MG/DL — SIGNIFICANT CHANGE UP (ref 8.5–10.1)
CALCIUM SERPL-MCNC: 8.7 MG/DL — SIGNIFICANT CHANGE UP (ref 8.5–10.1)
CHLORIDE SERPL-SCNC: 105 MMOL/L — SIGNIFICANT CHANGE UP (ref 98–110)
CHLORIDE SERPL-SCNC: 107 MMOL/L — SIGNIFICANT CHANGE UP (ref 98–110)
CO2 SERPL-SCNC: 21 MMOL/L — SIGNIFICANT CHANGE UP (ref 17–32)
CO2 SERPL-SCNC: 22 MMOL/L — SIGNIFICANT CHANGE UP (ref 17–32)
CREAT SERPL-MCNC: 0.9 MG/DL — SIGNIFICANT CHANGE UP (ref 0.7–1.5)
CREAT SERPL-MCNC: 1 MG/DL — SIGNIFICANT CHANGE UP (ref 0.7–1.5)
EOSINOPHIL # BLD AUTO: 0.21 K/UL — SIGNIFICANT CHANGE UP (ref 0–0.7)
EOSINOPHIL # BLD AUTO: 0.27 K/UL — SIGNIFICANT CHANGE UP (ref 0–0.7)
EOSINOPHIL NFR BLD AUTO: 2.2 % — SIGNIFICANT CHANGE UP (ref 0–8)
EOSINOPHIL NFR BLD AUTO: 2.5 % — SIGNIFICANT CHANGE UP (ref 0–8)
ETHANOL SERPL-MCNC: <10 MG/DL — SIGNIFICANT CHANGE UP
GLUCOSE SERPL-MCNC: 110 MG/DL — HIGH (ref 70–99)
GLUCOSE SERPL-MCNC: 99 MG/DL — SIGNIFICANT CHANGE UP (ref 70–99)
HCT VFR BLD CALC: 50.7 % — SIGNIFICANT CHANGE UP (ref 42–52)
HCT VFR BLD CALC: 51.9 % — SIGNIFICANT CHANGE UP (ref 42–52)
HGB BLD-MCNC: 16.7 G/DL — SIGNIFICANT CHANGE UP (ref 14–18)
HGB BLD-MCNC: 17.1 G/DL — SIGNIFICANT CHANGE UP (ref 14–18)
IMM GRANULOCYTES NFR BLD AUTO: 1 % — HIGH (ref 0.1–0.3)
IMM GRANULOCYTES NFR BLD AUTO: 1.5 % — HIGH (ref 0.1–0.3)
INR BLD: 1.14 RATIO — SIGNIFICANT CHANGE UP (ref 0.65–1.3)
LIDOCAIN IGE QN: 27 U/L — SIGNIFICANT CHANGE UP (ref 7–60)
LYMPHOCYTES # BLD AUTO: 2.43 K/UL — SIGNIFICANT CHANGE UP (ref 1.2–3.4)
LYMPHOCYTES # BLD AUTO: 2.69 K/UL — SIGNIFICANT CHANGE UP (ref 1.2–3.4)
LYMPHOCYTES # BLD AUTO: 22.5 % — SIGNIFICANT CHANGE UP (ref 20.5–51.1)
LYMPHOCYTES # BLD AUTO: 28 % — SIGNIFICANT CHANGE UP (ref 20.5–51.1)
MAGNESIUM SERPL-MCNC: 2.1 MG/DL — SIGNIFICANT CHANGE UP (ref 1.8–2.4)
MCHC RBC-ENTMCNC: 32.2 PG — HIGH (ref 27–31)
MCHC RBC-ENTMCNC: 32.3 PG — HIGH (ref 27–31)
MCHC RBC-ENTMCNC: 32.9 G/DL — SIGNIFICANT CHANGE UP (ref 32–37)
MCHC RBC-ENTMCNC: 32.9 G/DL — SIGNIFICANT CHANGE UP (ref 32–37)
MCV RBC AUTO: 97.7 FL — HIGH (ref 80–94)
MCV RBC AUTO: 98.1 FL — HIGH (ref 80–94)
MONOCYTES # BLD AUTO: 1.01 K/UL — HIGH (ref 0.1–0.6)
MONOCYTES # BLD AUTO: 1.41 K/UL — HIGH (ref 0.1–0.6)
MONOCYTES NFR BLD AUTO: 10.5 % — HIGH (ref 1.7–9.3)
MONOCYTES NFR BLD AUTO: 13.1 % — HIGH (ref 1.7–9.3)
NEUTROPHILS # BLD AUTO: 5.44 K/UL — SIGNIFICANT CHANGE UP (ref 1.4–6.5)
NEUTROPHILS # BLD AUTO: 6.48 K/UL — SIGNIFICANT CHANGE UP (ref 1.4–6.5)
NEUTROPHILS NFR BLD AUTO: 56.7 % — SIGNIFICANT CHANGE UP (ref 42.2–75.2)
NEUTROPHILS NFR BLD AUTO: 60.1 % — SIGNIFICANT CHANGE UP (ref 42.2–75.2)
NRBC # BLD: 0 /100 WBCS — SIGNIFICANT CHANGE UP (ref 0–0)
NRBC # BLD: 0 /100 WBCS — SIGNIFICANT CHANGE UP (ref 0–0)
PHOSPHATE SERPL-MCNC: 3.4 MG/DL — SIGNIFICANT CHANGE UP (ref 2.1–4.9)
PLATELET # BLD AUTO: 200 K/UL — SIGNIFICANT CHANGE UP (ref 130–400)
PLATELET # BLD AUTO: 207 K/UL — SIGNIFICANT CHANGE UP (ref 130–400)
POTASSIUM SERPL-MCNC: 3.9 MMOL/L — SIGNIFICANT CHANGE UP (ref 3.5–5)
POTASSIUM SERPL-MCNC: 4.2 MMOL/L — SIGNIFICANT CHANGE UP (ref 3.5–5)
POTASSIUM SERPL-SCNC: 3.9 MMOL/L — SIGNIFICANT CHANGE UP (ref 3.5–5)
POTASSIUM SERPL-SCNC: 4.2 MMOL/L — SIGNIFICANT CHANGE UP (ref 3.5–5)
PROT SERPL-MCNC: 6.8 G/DL — SIGNIFICANT CHANGE UP (ref 6–8)
PROTHROM AB SERPL-ACNC: 13.1 SEC — HIGH (ref 9.95–12.87)
RBC # BLD: 5.19 M/UL — SIGNIFICANT CHANGE UP (ref 4.7–6.1)
RBC # BLD: 5.29 M/UL — SIGNIFICANT CHANGE UP (ref 4.7–6.1)
RBC # FLD: 13.8 % — SIGNIFICANT CHANGE UP (ref 11.5–14.5)
RBC # FLD: 13.9 % — SIGNIFICANT CHANGE UP (ref 11.5–14.5)
SODIUM SERPL-SCNC: 140 MMOL/L — SIGNIFICANT CHANGE UP (ref 135–146)
SODIUM SERPL-SCNC: 143 MMOL/L — SIGNIFICANT CHANGE UP (ref 135–146)
WBC # BLD: 10.79 K/UL — SIGNIFICANT CHANGE UP (ref 4.8–10.8)
WBC # BLD: 9.6 K/UL — SIGNIFICANT CHANGE UP (ref 4.8–10.8)
WBC # FLD AUTO: 10.79 K/UL — SIGNIFICANT CHANGE UP (ref 4.8–10.8)
WBC # FLD AUTO: 9.6 K/UL — SIGNIFICANT CHANGE UP (ref 4.8–10.8)

## 2019-11-29 PROCEDURE — 70450 CT HEAD/BRAIN W/O DYE: CPT | Mod: 26

## 2019-11-29 PROCEDURE — 99285 EMERGENCY DEPT VISIT HI MDM: CPT

## 2019-11-29 PROCEDURE — 74177 CT ABD & PELVIS W/CONTRAST: CPT | Mod: 26

## 2019-11-29 PROCEDURE — 99223 1ST HOSP IP/OBS HIGH 75: CPT

## 2019-11-29 PROCEDURE — 72125 CT NECK SPINE W/O DYE: CPT | Mod: 26

## 2019-11-29 PROCEDURE — 72170 X-RAY EXAM OF PELVIS: CPT | Mod: 26

## 2019-11-29 PROCEDURE — 71045 X-RAY EXAM CHEST 1 VIEW: CPT | Mod: 26

## 2019-11-29 PROCEDURE — 71260 CT THORAX DX C+: CPT | Mod: 26

## 2019-11-29 RX ORDER — PANTOPRAZOLE SODIUM 20 MG/1
40 TABLET, DELAYED RELEASE ORAL
Refills: 0 | Status: DISCONTINUED | OUTPATIENT
Start: 2019-11-29 | End: 2019-11-30

## 2019-11-29 RX ORDER — TAMSULOSIN HYDROCHLORIDE 0.4 MG/1
0.4 CAPSULE ORAL AT BEDTIME
Refills: 0 | Status: DISCONTINUED | OUTPATIENT
Start: 2019-11-29 | End: 2019-11-30

## 2019-11-29 RX ORDER — OXYCODONE HYDROCHLORIDE 5 MG/1
5 TABLET ORAL EVERY 6 HOURS
Refills: 0 | Status: DISCONTINUED | OUTPATIENT
Start: 2019-11-29 | End: 2019-11-30

## 2019-11-29 RX ORDER — ACETAMINOPHEN 500 MG
650 TABLET ORAL EVERY 6 HOURS
Refills: 0 | Status: DISCONTINUED | OUTPATIENT
Start: 2019-11-29 | End: 2019-11-30

## 2019-11-29 RX ORDER — CLOPIDOGREL BISULFATE 75 MG/1
75 TABLET, FILM COATED ORAL DAILY
Refills: 0 | Status: DISCONTINUED | OUTPATIENT
Start: 2019-11-29 | End: 2019-11-30

## 2019-11-29 RX ORDER — GABAPENTIN 400 MG/1
400 CAPSULE ORAL
Refills: 0 | Status: DISCONTINUED | OUTPATIENT
Start: 2019-11-29 | End: 2019-11-30

## 2019-11-29 RX ORDER — APIXABAN 2.5 MG/1
5 TABLET, FILM COATED ORAL EVERY 12 HOURS
Refills: 0 | Status: DISCONTINUED | OUTPATIENT
Start: 2019-11-29 | End: 2019-11-30

## 2019-11-29 RX ORDER — METOPROLOL TARTRATE 50 MG
100 TABLET ORAL DAILY
Refills: 0 | Status: DISCONTINUED | OUTPATIENT
Start: 2019-11-29 | End: 2019-11-30

## 2019-11-29 RX ORDER — FINASTERIDE 5 MG/1
5 TABLET, FILM COATED ORAL DAILY
Refills: 0 | Status: DISCONTINUED | OUTPATIENT
Start: 2019-11-29 | End: 2019-11-30

## 2019-11-29 RX ORDER — ATORVASTATIN CALCIUM 80 MG/1
80 TABLET, FILM COATED ORAL AT BEDTIME
Refills: 0 | Status: DISCONTINUED | OUTPATIENT
Start: 2019-11-29 | End: 2019-11-30

## 2019-11-29 RX ORDER — CHLORHEXIDINE GLUCONATE 213 G/1000ML
1 SOLUTION TOPICAL ONCE
Refills: 0 | Status: DISCONTINUED | OUTPATIENT
Start: 2019-11-29 | End: 2019-11-30

## 2019-11-29 RX ADMIN — GABAPENTIN 400 MILLIGRAM(S): 400 CAPSULE ORAL at 17:37

## 2019-11-29 RX ADMIN — TAMSULOSIN HYDROCHLORIDE 0.4 MILLIGRAM(S): 0.4 CAPSULE ORAL at 21:08

## 2019-11-29 RX ADMIN — Medication 650 MILLIGRAM(S): at 17:37

## 2019-11-29 RX ADMIN — ATORVASTATIN CALCIUM 80 MILLIGRAM(S): 80 TABLET, FILM COATED ORAL at 21:08

## 2019-11-29 RX ADMIN — APIXABAN 5 MILLIGRAM(S): 2.5 TABLET, FILM COATED ORAL at 21:08

## 2019-11-29 RX ADMIN — Medication 100 MILLIGRAM(S): at 21:12

## 2019-11-29 NOTE — ED PROVIDER NOTE - PATIENT PORTAL LINK FT
You can access the FollowMyHealth Patient Portal offered by [facility] by registering at the following website: [email or web address]. By joining [facility]’s FollowMyHealth portal, you will also be able to view your health information using other applications (apps) compatible with our system.

## 2019-11-29 NOTE — PROVIDER CONTACT NOTE (OTHER) - SITUATION
there is an active stat order for a Cardiac monitor. does pt need to be on a monitor? if not can you please D/C order

## 2019-11-29 NOTE — ED ADULT NURSE REASSESSMENT NOTE - NS ED NURSE REASSESS COMMENT FT1
Pt attempted to ambulate ED. C/o dizziness & abdominal pain and states "I want to rest". VSS and MD aware.

## 2019-11-29 NOTE — ED ADULT NURSE NOTE - PMH
BPH (benign prostatic hyperplasia)    Hyperlipidemia    Hypertension    Pulmonary embolus    Spinal stenosis

## 2019-11-29 NOTE — ED PROVIDER NOTE - NSFOLLOWUPINSTRUCTIONS_ED_ALL_ED_FT
Fall Prevention in the Home    Falls can cause injuries. They can happen to people of all ages. There are many things you can do to make your home safe and to help prevent falls.     WHAT CAN I DO ON THE OUTSIDE OF MY HOME?  Regularly fix the edges of walkways and driveways and fix any cracks.  Remove anything that might make you trip as you walk through a door, such as a raised step or threshold.  Trim any bushes or trees on the path to your home.  Use bright outdoor lighting.  Clear any walking paths of anything that might make someone trip, such as rocks or tools.  Regularly check to see if handrails are loose or broken. Make sure that both sides of any steps have handrails.  Any raised decks and porches should have guardrails on the edges.  Have any leaves, snow, or ice cleared regularly.  Use sand or salt on walking paths during winter.  Clean up any spills in your garage right away. This includes oil or grease spills.    WHAT CAN I DO IN THE BATHROOM?  Use night lights.  Install grab bars by the toilet and in the tub and shower. Do not use towel bars as grab bars.  Use non-skid mats or decals in the tub or shower.  If you need to sit down in the shower, use a plastic, non-slip stool.  Keep the floor dry. Clean up any water that spills on the floor as soon as it happens.  Remove soap buildup in the tub or shower regularly.  Attach bath mats securely with double-sided non-slip rug tape.  Do not have throw rugs and other things on the floor that can make you trip.    WHAT CAN I DO IN THE BEDROOM?  Use night lights.  Make sure that you have a light by your bed that is easy to reach.  Do not use any sheets or blankets that are too big for your bed. They should not hang down onto the floor.  Have a firm chair that has side arms. You can use this for support while you get dressed.  Do not have throw rugs and other things on the floor that can make you trip.    WHAT CAN I DO IN THE KITCHEN?  Clean up any spills right away.  Avoid walking on wet floors.  Keep items that you use a lot in easy-to-reach places.  If you need to reach something above you, use a strong step stool that has a grab bar.  Keep electrical cords out of the way.  Do not use floor polish or wax that makes floors slippery. If you must use wax, use non-skid floor wax.  Do not have throw rugs and other things on the floor that can make you trip.    WHAT CAN I DO WITH MY STAIRS?  Do not leave any items on the stairs.  Make sure that there are handrails on both sides of the stairs and use them. Fix handrails that are broken or loose. Make sure that handrails are as long as the stairways.  Check any carpeting to make sure that it is firmly attached to the stairs. Fix any carpet that is loose or worn.  Avoid having throw rugs at the top or bottom of the stairs. If you do have throw rugs, attach them to the floor with carpet tape.  Make sure that you have a light switch at the top of the stairs and the bottom of the stairs. If you do not have them, ask someone to add them for you.    WHAT ELSE CAN I DO TO HELP PREVENT FALLS?  Wear shoes that:  Do not have high heels.  Have rubber bottoms.  Are comfortable and fit you well.  Are closed at the toe. Do not wear sandals.  If you use a stepladder:  Make sure that it is fully opened. Do not climb a closed stepladder.  Make sure that both sides of the stepladder are locked into place.  Ask someone to hold it for you, if possible.  Clearly raciel and make sure that you can see:  Any grab bars or handrails.  First and last steps.  Where the edge of each step is.  Use tools that help you move around (mobility aids) if they are needed. These include:  Canes.  Walkers.  Scooters.  Crutches.  Turn on the lights when you go into a dark area. Replace any light bulbs as soon as they burn out.  Set up your furniture so you have a clear path. Avoid moving your furniture around.  If any of your floors are uneven, fix them.  If there are any pets around you, be aware of where they are.  Review your medicines with your doctor. Some medicines can make you feel dizzy. This can increase your chance of falling.    Ask your doctor what other things that you can do to help prevent falls.    ADDITIONAL NOTES AND INSTRUCTIONS    Please follow up with your Primary MD in 24-48 hr.  Seek immediate medical care for any new/worsening signs or symptoms.
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
## 2019-11-29 NOTE — PROVIDER CONTACT NOTE (MEDICATION) - SITUATION
pt is refusing to take Eliquis at this time. pt stated he takes it at 10 pm usually can RN reschedule medication to 10 PM. also does team want Eliquis given. are there any concerns of bleeding?

## 2019-11-29 NOTE — H&P ADULT - NSHPPHYSICALEXAM_GEN_ALL_CORE
--------------------------------------------------------------------------------------  VITAL SIGNS, INS/OUTS (last 24 hours):  --------------------------------------------------------------------------------------  ICU Vital Signs Last 24 Hrs  T(C): 36.5 (29 Nov 2019 12:10), Max: 36.5 (29 Nov 2019 12:10)  T(F): 97.7 (29 Nov 2019 12:10), Max: 97.7 (29 Nov 2019 12:10)  HR: 89 (29 Nov 2019 12:10) (89 - 89)  BP: 129/88 (29 Nov 2019 12:10) (129/88 - 129/88)  RR: 21 (29 Nov 2019 12:10) (21 - 21)  SpO2: 98% (29 Nov 2019 12:10) (98% - 98%)  --------------------------------------------------------------------------------------  PHYSICAL EXAM  General: NAD, AAOx3, calm and cooperative  HEENT: NCAT, SARA, EOMI, Trachea ML, Neck supple  Cardiac: RRR S1, S2, no Murmurs, rubs or gallops  Respiratory: CTAB, normal respiratory effort, breath sounds equal BL, no wheeze, rhonchi or crackles  No chest wall, clavicular, or sternal tenderness  Abdomen: Soft, non-distended, non-tender, +bowel sounds, +seatbelt sign over L lower abdomen, obese  Musculoskeletal: Strength 5/5 BL UE/LE, ROM intact, compartments soft  Has mild bruising over both anterior lower legs with no corresponding point tenderness  Spine: No tenderness in C spine , T spine , L spine , no step offs/deformities  Neuro: Sensation grossly intact and equal throughout, CN II-XII intact, no focal deficits  Vascular: Pulses 2+ throughout, extremities well perfused  Skin: Warm/dry, normal color, no lacerations or abrasions

## 2019-11-29 NOTE — H&P ADULT - NSICDXPASTMEDICALHX_GEN_ALL_CORE_FT
PAST MEDICAL HISTORY:  BPH (benign prostatic hyperplasia)     Hyperlipidemia     Hypertension     Pulmonary embolus     Spinal stenosis

## 2019-11-29 NOTE — H&P ADULT - NSHPREVIEWOFSYSTEMS_GEN_ALL_CORE
ROS:    REVIEW OF SYSTEMS  [x  A ten-point review of systems was otherwise negative except as noted.  [ ] Due to altered mental status/intubation, subjective information were not able to be obtained from the patient. History was obtained, to the extent possible, from review of the chart and collateral sources of information.
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
## 2019-11-29 NOTE — ED PROVIDER NOTE - PHYSICAL EXAMINATION
CONSTITUTIONAL: Elderly; well-nourished; in no acute distress, speaking in full sentences, moving all extremities, GCS 15  SKIN: warm, dry  HEAD: Normocephalic; atraumatic  EYES: PERRL, EOMI, no conjunctival erythema  ENT: No nasal discharge; airway clear, mucous membranes moist, no septal hematoma  NECK: Supple; non tender, FROM  CARD: +S1, S2 no murmurs, gallops, or rubs. Regular rate and rhythm. radial 2+, no chest wall tenderness or crepitus, no erythema, edema, ecchymosis  RESP: No wheezes, rales or rhonchi. CTABL  ABD: soft ntnd, no rebound, no guarding, no rigidity  BACK: no midline spinal tenderness, no step offs, no ecchymosis, edema or erythema  EXT: moves all extremities, No clubbing, cyanosis or edema   NEURO: Alert, oriented, grossly unremarkable, no focal deficits, speaking in full sentences, following commands  PSYCH: Cooperative, appropriate

## 2019-11-29 NOTE — ED PROVIDER NOTE - CARE PROVIDER_API CALL
Renea Teixeira)  Internal Medicine  [address] [place] [address]  Phone: [phone]  Fax: [phone]  Follow Up Time: 1-3 Days
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
## 2019-11-29 NOTE — ED PROVIDER NOTE - PROGRESS NOTE DETAILS
CASE D/W TRAUMA TEAM, WILL ADMIT FOR OBSERVATION. SC: CTs negative. PT to be admitted for observation due to severity of mechanism and persistent dizziness
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
## 2019-11-29 NOTE — H&P ADULT - NSHPLABSRESULTS_GEN_ALL_CORE
LABS  --------------------------------------------------------------------------------------  Labs:  CAPILLARY BLOOD GLUCOSE  POCT Blood Glucose.: 132 mg/dL (29 Nov 2019 12:21)                          17.1   9.60  )-----------( 207      ( 29 Nov 2019 12:20 )             51.9     11-29    143  |  107  |  16  ----------------------------<  110<H>  4.2   |  21  |  1.0    Ca    8.7      29 Nov 2019 12:20    TPro  6.8  /  Alb  4.2  /  TBili  0.6  /  DBili  x   /  AST  23  /  ALT  22  /  AlkPhos  94  11-29    PT/INR - ( 29 Nov 2019 12:20 )   PT: 13.10 sec;   INR: 1.14 ratio    PTT - ( 29 Nov 2019 12:20 )  PTT:28.5 sec    Blood Gas Venous - Lactate (12.28.18 @ 08:28)    Blood Gas Venous - Lactate: 1.6 mmoL/L    Lipase, Serum (11.29.19 @ 12:20)    Lipase, Serum: 27 U/L    Alcohol, Blood (11.29.19 @ 12:20)    Alcohol, Blood: <10 mg/dL  --------------------------------------------------------------------------------------  IMAGING RESULTS  < from: CT Head No Cont (11.29.19 @ 12:44) >  IMPRESSION:  In comparison with the prior noncontrast CT scan of the brain dated   December 28, 2018:  No acute intracranial hemorrhage.  Stable examination.  < end of copied text >    < from: CT Cervical Spine No Cont (11.29.19 @ 12:51) >  IMPRESSION:  In comparisonwith the prior noncontrast CT scan of the cervical spine   dated December 28, 2018:  No acute fracture demonstrated.  Straightening of the normal cervical lordosis may be secondary to patient   positioning or muscle spasm.  Stable examination.  < end of copied text >    < from: CT Chest w/ IV Cont (11.29.19 @ 13:00) >  IMPRESSION:   No acute traumatic abnormality in the chest abdomen and pelvis.  < end of copied text >    < from: Xray Chest 1 View AP/PA (11.29.19 @ 12:33) >  Impression:    No radiographic evidence of acute pulmonary disease.  < end of copied text >    < from: Xray Pelvis AP only (11.29.19 @ 12:33) >  Impression:  No acute fracture or dislocation.  < end of copied text >  ---------------------------------------------------------------------------------------
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
## 2019-11-30 ENCOUNTER — TRANSCRIPTION ENCOUNTER (OUTPATIENT)
Age: 69
End: 2019-11-30

## 2019-11-30 VITALS
TEMPERATURE: 97 F | HEART RATE: 72 BPM | DIASTOLIC BLOOD PRESSURE: 65 MMHG | SYSTOLIC BLOOD PRESSURE: 109 MMHG | RESPIRATION RATE: 18 BRPM

## 2019-11-30 LAB
APPEARANCE UR: CLEAR — SIGNIFICANT CHANGE UP
BACTERIA # UR AUTO: ABNORMAL
BILIRUB UR-MCNC: NEGATIVE — SIGNIFICANT CHANGE UP
COLOR SPEC: SIGNIFICANT CHANGE UP
CRP SERPL-MCNC: <0.1 MG/DL — SIGNIFICANT CHANGE UP (ref 0–0.4)
DIFF PNL FLD: ABNORMAL
EPI CELLS # UR: 0 /HPF — SIGNIFICANT CHANGE UP (ref 0–5)
ERYTHROCYTE [SEDIMENTATION RATE] IN BLOOD: 2 MM/HR — SIGNIFICANT CHANGE UP (ref 0–10)
GLUCOSE UR QL: NEGATIVE — SIGNIFICANT CHANGE UP
HYALINE CASTS # UR AUTO: 0 /LPF — SIGNIFICANT CHANGE UP (ref 0–7)
KETONES UR-MCNC: NEGATIVE — SIGNIFICANT CHANGE UP
LEUKOCYTE ESTERASE UR-ACNC: NEGATIVE — SIGNIFICANT CHANGE UP
NITRITE UR-MCNC: NEGATIVE — SIGNIFICANT CHANGE UP
PH UR: 6 — SIGNIFICANT CHANGE UP (ref 5–8)
PROT UR-MCNC: NEGATIVE — SIGNIFICANT CHANGE UP
RBC CASTS # UR COMP ASSIST: 17 /HPF — HIGH (ref 0–4)
SP GR SPEC: 1.03 — HIGH (ref 1.01–1.02)
UROBILINOGEN FLD QL: SIGNIFICANT CHANGE UP
WBC UR QL: 3 /HPF — SIGNIFICANT CHANGE UP (ref 0–5)

## 2019-11-30 PROCEDURE — 99238 HOSP IP/OBS DSCHRG MGMT 30/<: CPT

## 2019-11-30 RX ORDER — ACETAMINOPHEN 500 MG
650 TABLET ORAL ONCE
Refills: 0 | Status: COMPLETED | OUTPATIENT
Start: 2019-11-30 | End: 2019-11-30

## 2019-11-30 RX ORDER — BENZOCAINE AND MENTHOL 5; 1 G/100ML; G/100ML
1 LIQUID ORAL
Refills: 0 | Status: DISCONTINUED | OUTPATIENT
Start: 2019-11-30 | End: 2019-11-30

## 2019-11-30 RX ADMIN — Medication 650 MILLIGRAM(S): at 16:35

## 2019-11-30 RX ADMIN — Medication 650 MILLIGRAM(S): at 16:34

## 2019-11-30 RX ADMIN — GABAPENTIN 400 MILLIGRAM(S): 400 CAPSULE ORAL at 06:20

## 2019-11-30 RX ADMIN — APIXABAN 5 MILLIGRAM(S): 2.5 TABLET, FILM COATED ORAL at 09:54

## 2019-11-30 RX ADMIN — PANTOPRAZOLE SODIUM 40 MILLIGRAM(S): 20 TABLET, DELAYED RELEASE ORAL at 08:17

## 2019-11-30 RX ADMIN — Medication 650 MILLIGRAM(S): at 02:20

## 2019-11-30 RX ADMIN — Medication 650 MILLIGRAM(S): at 01:50

## 2019-11-30 RX ADMIN — Medication 650 MILLIGRAM(S): at 08:16

## 2019-11-30 RX ADMIN — CLOPIDOGREL BISULFATE 75 MILLIGRAM(S): 75 TABLET, FILM COATED ORAL at 10:07

## 2019-11-30 NOTE — PHYSICAL THERAPY INITIAL EVALUATION ADULT - PERSONAL SAFETY AND JUDGMENT, REHAB EVAL
patient declined use of rolling walker multiple times , wife states he has been recommended use of rolling walker in past but declines despite education/at risk behaviors demonstrated

## 2019-11-30 NOTE — DISCHARGE NOTE PROVIDER - CARE PROVIDER_API CALL
Se Wallis)  [facility]  [address] [place] [address], [address] [place] [address]  Phone: [phone]  Fax: [phone]  Follow Up Time:

## 2019-11-30 NOTE — CHART NOTE - NSCHARTNOTEFT_GEN_A_CORE
11/29 @22:00- Patient reports no changes in his abdominal pain. On exam, he has linear ecchymoses on his left abdomen at the level of the umbilicus. He reports tenderness over this area but has no signs of involuntary guarding. No peritonitic signs. He is denying any nausea or vomiting. Abdomen is distended and soft.     11/30 @2:00: Exam unchanged from 4 hours prior, no new complaints

## 2019-11-30 NOTE — DISCHARGE NOTE PROVIDER - NSDCFUADDINST_GEN_ALL_CORE_FT
Patient Name: MAURA RODRIGUEZ  MRN: [id number]  69y Male  Location: [place] [address] [address] ([facility] [id number])    Pain medication prescribed:      -You may take Tylenol, 650mg every 6 hours as needed and/or Motrin, 600mg every 6 hours as needed for mild pain control    Additional Instructions:  ***  Please call the clinic and confirm your appointment for follow up, see the follow up instructions and the physician's office number  below. Please call the clinic for any questions or concerns.    11-30-19 @ 10:08
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
## 2019-11-30 NOTE — PHYSICAL THERAPY INITIAL EVALUATION ADULT - PATIENT/FAMILY/SIGNIFICANT OTHER GOALS STATEMENT, PT EVAL
Patient wishes to return to work states , he has rollator and canes at home wife confirms he is currently amb at baseline
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
## 2019-11-30 NOTE — DISCHARGE NOTE NURSING/CASE MANAGEMENT/SOCIAL WORK - PATIENT PORTAL LINK FT
You can access the FollowMyHealth Patient Portal offered by [facility] by registering at the following website: [email or web address]. By joining [employer]’s FollowMyHealth portal, you will also be able to view your health information using other applications (apps) compatible with our system.

## 2019-12-01 LAB
HCV AB S/CO SERPL IA: 0.1 S/CO — SIGNIFICANT CHANGE UP (ref 0–0.99)
HCV AB SERPL-IMP: SIGNIFICANT CHANGE UP

## 2019-12-04 DIAGNOSIS — N40.0 BENIGN PROSTATIC HYPERPLASIA WITHOUT LOWER URINARY TRACT SYMPTOMS: ICD-10-CM

## 2019-12-04 DIAGNOSIS — E78.5 HYPERLIPIDEMIA, UNSPECIFIED: ICD-10-CM

## 2019-12-04 DIAGNOSIS — S80.12XA CONTUSION OF LEFT LOWER LEG, INITIAL ENCOUNTER: ICD-10-CM
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
## 2019-12-04 DIAGNOSIS — I25.10 ATHEROSCLEROTIC HEART DISEASE OF NATIVE CORONARY ARTERY WITHOUT ANGINA PECTORIS: ICD-10-CM

## 2019-12-04 DIAGNOSIS — M48.00 SPINAL STENOSIS, SITE UNSPECIFIED: ICD-10-CM

## 2019-12-04 DIAGNOSIS — S80.11XA CONTUSION OF RIGHT LOWER LEG, INITIAL ENCOUNTER: ICD-10-CM

## 2019-12-04 DIAGNOSIS — I10 ESSENTIAL (PRIMARY) HYPERTENSION: ICD-10-CM
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
## 2019-12-04 DIAGNOSIS — S09.8XXA OTHER SPECIFIED INJURIES OF HEAD, INITIAL ENCOUNTER: ICD-10-CM

## 2019-12-04 DIAGNOSIS — I48.91 UNSPECIFIED ATRIAL FIBRILLATION: ICD-10-CM

## 2019-12-04 DIAGNOSIS — S30.1XXA CONTUSION OF ABDOMINAL WALL, INITIAL ENCOUNTER: ICD-10-CM

## 2020-03-15 ENCOUNTER — TRANSCRIPTION ENCOUNTER (OUTPATIENT)
Age: 70
End: 2020-03-15

## 2020-08-26 ENCOUNTER — EMERGENCY (EMERGENCY)
Facility: HOSPITAL | Age: 70
LOS: 0 days | Discharge: HOME | End: 2020-08-26
Attending: EMERGENCY MEDICINE | Admitting: EMERGENCY MEDICINE
Payer: COMMERCIAL

## 2020-08-26 VITALS
OXYGEN SATURATION: 98 % | RESPIRATION RATE: 20 BRPM | DIASTOLIC BLOOD PRESSURE: 71 MMHG | HEIGHT: 69 IN | WEIGHT: 255.07 LBS | SYSTOLIC BLOOD PRESSURE: 126 MMHG | HEART RATE: 54 BPM | TEMPERATURE: 98 F

## 2020-08-26 DIAGNOSIS — R06.02 SHORTNESS OF BREATH: ICD-10-CM

## 2020-08-26 DIAGNOSIS — Z87.438 PERSONAL HISTORY OF OTHER DISEASES OF MALE GENITAL ORGANS: ICD-10-CM

## 2020-08-26 DIAGNOSIS — I10 ESSENTIAL (PRIMARY) HYPERTENSION: ICD-10-CM

## 2020-08-26 DIAGNOSIS — I25.10 ATHEROSCLEROTIC HEART DISEASE OF NATIVE CORONARY ARTERY WITHOUT ANGINA PECTORIS: ICD-10-CM

## 2020-08-26 DIAGNOSIS — Z95.5 PRESENCE OF CORONARY ANGIOPLASTY IMPLANT AND GRAFT: Chronic | ICD-10-CM

## 2020-08-26 DIAGNOSIS — Z95.5 PRESENCE OF CORONARY ANGIOPLASTY IMPLANT AND GRAFT: ICD-10-CM

## 2020-08-26 DIAGNOSIS — E78.5 HYPERLIPIDEMIA, UNSPECIFIED: ICD-10-CM

## 2020-08-26 PROBLEM — I26.99 OTHER PULMONARY EMBOLISM WITHOUT ACUTE COR PULMONALE: Chronic | Status: ACTIVE | Noted: 2019-11-29

## 2020-08-26 LAB
ALBUMIN SERPL ELPH-MCNC: 3.8 G/DL — SIGNIFICANT CHANGE UP (ref 3.5–5.2)
ALP SERPL-CCNC: 71 U/L — SIGNIFICANT CHANGE UP (ref 30–115)
ALT FLD-CCNC: 12 U/L — SIGNIFICANT CHANGE UP (ref 0–41)
ANION GAP SERPL CALC-SCNC: 10 MMOL/L — SIGNIFICANT CHANGE UP (ref 7–14)
APTT BLD: 33.9 SEC — SIGNIFICANT CHANGE UP (ref 27–39.2)
AST SERPL-CCNC: 15 U/L — SIGNIFICANT CHANGE UP (ref 0–41)
BASOPHILS # BLD AUTO: 0.1 K/UL — SIGNIFICANT CHANGE UP (ref 0–0.2)
BASOPHILS NFR BLD AUTO: 1.1 % — HIGH (ref 0–1)
BILIRUB SERPL-MCNC: 0.7 MG/DL — SIGNIFICANT CHANGE UP (ref 0.2–1.2)
BUN SERPL-MCNC: 19 MG/DL — SIGNIFICANT CHANGE UP (ref 10–20)
CALCIUM SERPL-MCNC: 8.7 MG/DL — SIGNIFICANT CHANGE UP (ref 8.5–10.1)
CHLORIDE SERPL-SCNC: 106 MMOL/L — SIGNIFICANT CHANGE UP (ref 98–110)
CO2 SERPL-SCNC: 23 MMOL/L — SIGNIFICANT CHANGE UP (ref 17–32)
CREAT SERPL-MCNC: 1 MG/DL — SIGNIFICANT CHANGE UP (ref 0.7–1.5)
EOSINOPHIL # BLD AUTO: 0.24 K/UL — SIGNIFICANT CHANGE UP (ref 0–0.7)
EOSINOPHIL NFR BLD AUTO: 2.6 % — SIGNIFICANT CHANGE UP (ref 0–8)
GLUCOSE SERPL-MCNC: 101 MG/DL — HIGH (ref 70–99)
HCT VFR BLD CALC: 49.6 % — SIGNIFICANT CHANGE UP (ref 42–52)
HGB BLD-MCNC: 16.4 G/DL — SIGNIFICANT CHANGE UP (ref 14–18)
IMM GRANULOCYTES NFR BLD AUTO: 1.5 % — HIGH (ref 0.1–0.3)
INR BLD: 1.21 RATIO — SIGNIFICANT CHANGE UP (ref 0.65–1.3)
LYMPHOCYTES # BLD AUTO: 2.28 K/UL — SIGNIFICANT CHANGE UP (ref 1.2–3.4)
LYMPHOCYTES # BLD AUTO: 24.8 % — SIGNIFICANT CHANGE UP (ref 20.5–51.1)
MCHC RBC-ENTMCNC: 32.3 PG — HIGH (ref 27–31)
MCHC RBC-ENTMCNC: 33.1 G/DL — SIGNIFICANT CHANGE UP (ref 32–37)
MCV RBC AUTO: 97.6 FL — HIGH (ref 80–94)
MONOCYTES # BLD AUTO: 0.92 K/UL — HIGH (ref 0.1–0.6)
MONOCYTES NFR BLD AUTO: 10 % — HIGH (ref 1.7–9.3)
NEUTROPHILS # BLD AUTO: 5.51 K/UL — SIGNIFICANT CHANGE UP (ref 1.4–6.5)
NEUTROPHILS NFR BLD AUTO: 60 % — SIGNIFICANT CHANGE UP (ref 42.2–75.2)
NRBC # BLD: 0 /100 WBCS — SIGNIFICANT CHANGE UP (ref 0–0)
NT-PROBNP SERPL-SCNC: 345 PG/ML — HIGH (ref 0–300)
PLATELET # BLD AUTO: 207 K/UL — SIGNIFICANT CHANGE UP (ref 130–400)
POTASSIUM SERPL-MCNC: 4.2 MMOL/L — SIGNIFICANT CHANGE UP (ref 3.5–5)
POTASSIUM SERPL-SCNC: 4.2 MMOL/L — SIGNIFICANT CHANGE UP (ref 3.5–5)
PROT SERPL-MCNC: 6 G/DL — SIGNIFICANT CHANGE UP (ref 6–8)
PROTHROM AB SERPL-ACNC: 13.9 SEC — HIGH (ref 9.95–12.87)
RBC # BLD: 5.08 M/UL — SIGNIFICANT CHANGE UP (ref 4.7–6.1)
RBC # FLD: 13.8 % — SIGNIFICANT CHANGE UP (ref 11.5–14.5)
SODIUM SERPL-SCNC: 139 MMOL/L — SIGNIFICANT CHANGE UP (ref 135–146)
TROPONIN T SERPL-MCNC: <0.01 NG/ML — SIGNIFICANT CHANGE UP
WBC # BLD: 9.19 K/UL — SIGNIFICANT CHANGE UP (ref 4.8–10.8)
WBC # FLD AUTO: 9.19 K/UL — SIGNIFICANT CHANGE UP (ref 4.8–10.8)

## 2020-08-26 PROCEDURE — 99285 EMERGENCY DEPT VISIT HI MDM: CPT

## 2020-08-26 PROCEDURE — 71045 X-RAY EXAM CHEST 1 VIEW: CPT | Mod: 26

## 2020-08-26 PROCEDURE — 93010 ELECTROCARDIOGRAM REPORT: CPT

## 2020-08-26 NOTE — ED PROVIDER NOTE - OBJECTIVE STATEMENT
70 year old with hx of PE on Eliquis, BPH, HLD, HTN, CAD s/p 1 cardiac stent on Lipitor c/o episode of sob. Pt sts had episode of sob while trying to sleep x 1 hr. Currently denies any complaints. No chest pain, palpitations, fever, cough, leg pain/swelling, recent travel, abd pain, n/v. smoking hx. Pt follows with cardiology at [facility] Dr. Diehl.

## 2020-08-26 NOTE — ED PROVIDER NOTE - ATTENDING CONTRIBUTION TO CARE
I personally evaluated the patient. I reviewed the Resident’s or Physician Assistant’s note (as assigned above), and agree with the findings and plan except as documented in my note.    70 year old M with hx of PE on Eliquis, BPH, HLD, HTN, CAD s/p 1 cardiac stent on Lipitor c/o  brief episode of sob. Pt sts he had episode of sob while trying to sleep x 1 hr. Now states he does not have SOB. No CP. No abd pain. No leg swelling.     CONSTITUTIONAL: Well-developed; well-nourished; in no acute distress. Sitting up and providing appropriate history and physical examination  SKIN: skin exam is warm and dry, no acute rash.  HEAD: Normocephalic; atraumatic.  EYES: PERRL, 3 mm bilateral, no nystagmus, EOM intact; conjunctiva and sclera clear.  ENT: No nasal discharge; airway clear.  NECK: Supple; non tender.+ full passive ROM in all directions. No JVD  CARD: S1, S2 normal; no murmurs, gallops, or rubs. Regular rate and rhythm. + Symmetric Strong Pulses  RESP: No wheezes, rales or rhonchi. Good air movement bilaterally  ABD: soft; non-distended; non-tender. No Rebound, No Gaurding, No signs of peritnitis, No CVA tenderness  EXT: Normal ROM. No clubbing, cyanosis or edema. Dp and Pt Pulses intact. Cap refill less than 3 seconds  NEURO: CN 2-12 intact, normal finger to nose, normal romberg, stable gait, no sensory or motor deficits, Alert, oriented, grossly unremarkable. No Focal deficits. GCS 15. NIH 0  PSYCH: Cooperative, appropriate.    Plan- ECG, labs, CXR, reassess

## 2020-08-26 NOTE — ED PROVIDER NOTE - PROGRESS NOTE DETAILS
Pt feels much better. No SOB. No CP. Offered admission but patient politely declined. States he will f/u w his PMD and cardiologist. Full DC instructions discussed and patient knows when to seek immediate medical attention. Patient has proper follow-up. All results discussed with the patient they may require further work-up. Limitations of ED work-up discussed. All  questions and concerns from patient or family addressed. Understanding of insturctions verbalized

## 2020-08-26 NOTE — ED PROVIDER NOTE - NSFOLLOWUPINSTRUCTIONS_ED_ALL_ED_FT
Shortness of breath    Shortness of breath (dyspnea) means you have trouble breathing and could indicate a medical problem. Causes include lung disease, heart disease, low amount of red blood cells (anemia), poor physical fitness, being overweight, smoking, etc. Your health care provider today may not be able to find a cause for your shortness of breath after your exam. In this case, it is important to have a follow-up exam with your primary care physician as instructed. If medicines were prescribed, take them as directed for the full length of time directed. Refrain from tobacco products.    SEEK IMMEDIATE MEDICAL CARE IF YOU HAVE ANY OF THE FOLLOWING SYMPTOMS: worsening shortness of breath, chest pain, back pain, abdominal pain, fever, coughing up blood, lightheadedness/dizziness.    Please follow up with your cardiologist at [facility] Dr. Diehl

## 2020-08-26 NOTE — ED PROVIDER NOTE - PATIENT PORTAL LINK FT
You can access the FollowMyHealth Patient Portal offered by [facility] by registering at the following website: [email or web address]. By joining [employer]’s FollowMyHealth portal, you will also be able to view your health information using other applications (apps) compatible with our system.

## 2020-08-26 NOTE — ED PROVIDER NOTE - PHYSICAL EXAMINATION
CONSTITUTIONAL: Well-appearing; well-nourished; in no apparent distress.   EYES: PERRL; EOM intact.   ENT: normal nose; no rhinorrhea; normal pharynx with no tonsillar hypertrophy.   CARDIOVASCULAR: Normal S1, S2; no murmurs, rubs, or gallops. Equal radial pulses  RESPIRATORY: Normal chest excursion with respiration; breath sounds clear and equal bilaterally; no wheezes, rhonchi, or rales.  GI/: Normal bowel sounds; non-distended; non-tender; no palpable organomegaly.   MS: No evidence of trauma or deformity.  Normal ROM in all four extremities; non-tender to palpation; distal pulses are normal.   Extrem: no peripheral edema. No calf ttp  SKIN: Normal for age and race; warm; dry; good turgor; no apparent lesions or exudate.   NEURO/PSYCH: A & O x 4; grossly unremarkable. mood and manner are appropriate.

## 2020-09-18 NOTE — ED ADULT NURSE NOTE - LEARNS BEST
Contacted pharmacy d/t medication being refilled on 09/11/2020. Pharmacy did not have new order. Writer will resend.   
Hearing

## 2020-10-27 ENCOUNTER — APPOINTMENT (OUTPATIENT)
Dept: OTOLARYNGOLOGY | Facility: CLINIC | Age: 70
End: 2020-10-27
Payer: COMMERCIAL

## 2020-10-27 VITALS — TEMPERATURE: 97.6 F | HEIGHT: 69 IN | WEIGHT: 205 LBS | BODY MASS INDEX: 30.36 KG/M2

## 2020-10-27 DIAGNOSIS — H90.5 UNSPECIFIED SENSORINEURAL HEARING LOSS: ICD-10-CM

## 2020-10-27 PROCEDURE — 99072 ADDL SUPL MATRL&STAF TM PHE: CPT

## 2020-10-27 PROCEDURE — 99202 OFFICE O/P NEW SF 15 MIN: CPT

## 2020-10-27 RX ORDER — FUROSEMIDE 20 MG/1
20 TABLET ORAL
Qty: 30 | Refills: 0 | Status: ACTIVE | COMMUNITY
Start: 2020-09-15

## 2020-10-27 RX ORDER — GABAPENTIN 600 MG/1
600 TABLET, COATED ORAL
Qty: 270 | Refills: 0 | Status: ACTIVE | COMMUNITY
Start: 2020-07-30

## 2020-10-29 PROBLEM — H90.5 SNHL (SENSORINEURAL HEARING LOSS): Status: ACTIVE | Noted: 2020-10-29

## 2020-10-29 NOTE — ASSESSMENT
[FreeTextEntry1] : Based on his exam, historyand hearing test he is medically cleared for hearing aid amplification.

## 2020-10-29 NOTE — HISTORY OF PRESENT ILLNESS
[de-identified] : Initial visit.\par History of sensorineural hearing loss.\par He's been wearing a hearing aid or palpitation bilaterally for several years.\par He had a recent audiogram which are reviewed. This showed symmetric sloping to severe sensorineural hearing loss. He is here for medical clearance.

## 2020-12-23 NOTE — ED ADULT NURSE NOTE - CAS TRG GENERAL AIRWAY, MLM
Patent Picato Counseling:  I discussed with the patient the risks of Picato including but not limited to erythema, scaling, itching, weeping, crusting, and pain.

## 2021-05-19 ENCOUNTER — OUTPATIENT (OUTPATIENT)
Dept: OUTPATIENT SERVICES | Facility: HOSPITAL | Age: 71
LOS: 1 days | Discharge: HOME | End: 2021-05-19
Payer: COMMERCIAL

## 2021-05-19 DIAGNOSIS — R05 COUGH: ICD-10-CM

## 2021-05-19 DIAGNOSIS — Z95.5 PRESENCE OF CORONARY ANGIOPLASTY IMPLANT AND GRAFT: Chronic | ICD-10-CM

## 2021-05-19 PROCEDURE — 71046 X-RAY EXAM CHEST 2 VIEWS: CPT | Mod: 26

## 2021-06-23 NOTE — ED PROVIDER NOTE - NS ED MD DISPO DIVISION
CARDIOVASCULAR OFFICE  NOTE    Patient: Thien Downing Date: 6/23/2021   YOB: 1974    46 year old male      CHIEF COMPLAINT:   Chief Complaint   Patient presents with   • Follow-up     3 mo f/u       HISTORY OF PRESENT ILLNESS:  Referred by Dillan Green MD    Thien Downing is a 46 year old male with a medical history of cardiac arrest 2/3/2021 with subsequent inferolateral STEMI s/p stenting to proximal circumflex, hypertension, cardiomyopathy, and acute systolic and diastolic dysfunction. He presents to the clinic for a routine follow up visit. He is accompanied by his daughter.    Today, he reports doing well from a cardiac standpoint. He reports bruising easily on Brilinta. Discussed with patient that he is to continue on Brilinta for 1 year from stenting and aspirin for life. He reports smoking cigars on rare occasions. He denies any tobacco use. He plans to travel to [facility] soon. No other complaints at this time.    No past medical history on file.    Past Surgical History:   Procedure Laterality Date   • Anterior cruciate ligament repair Left 2012    reconstruction   • Appendectomy     • Wrist fracture surgery Right        ALLERGIES:   Allergen Reactions   • Cephalexin RASH     Body rash - tolerated ceftriaxone 02/2021 admit   • Sulfamethoxazole W/Trimethoprim RASH     Body rash       Current Medications    ASPIRIN 81 MG CHEWABLE TABLET    Chew 1 tablet by mouth daily.    ATORVASTATIN (LIPITOR) 20 MG TABLET    Take 1 tablet by mouth nightly.    CARVEDILOL (COREG) 25 MG TABLET    Take 1 tablet by mouth every 12 hours.    FUROSEMIDE (LASIX) 20 MG TABLET    USE as NEEDED for Weight Gain of over 3 pounds in 24 hour period or over 5 pounds in 72 hour period with associated edema (swelling) of the legs.    LOSARTAN (COZAAR) 50 MG TABLET    Take 1 tablet by mouth daily.    TICAGRELOR (BRILINTA) 90 MG TAB    Take 1 tablet by mouth every 12 hours.       Social History     Tobacco Use   Smoking Status  Former Smoker   • Packs/day: 1.00   • Types: Cigarettes   • Quit date: 2/3/2021   • Years since quittin.3   Smokeless Tobacco Current User       Social History     Substance and Sexual Activity   Alcohol Use Yes    Comment: mixed drinks 4-5 drinks a week       Family History   Problem Relation Age of Onset   • Dementia/Alzheimers Mother    • Heart disease Mother    • Stroke Father        REVIEW OF SYSTEMS:  A 12 point ROS was done and is noncontributory unless otherwise stated in the HPI.    PHYSICAL EXAM:   Vitals:    21 0931   BP: 120/64   Pulse: 60       Constitutional: Well developed, well nourished 46 year old male in no apparent distress.  Skin: Warm, dry and intact.  HEENT: Normocephalic, atraumatic. Mucous membranes moist, EOMs intact. No masses or lesions.  Neck: Supple, trachea midline, negative JVD or HJR at 45 degrees, negative carotid bruits bilaterally.  Cardiovascular: S1, S2 regular rate and rhythm.No murmur. No thrill.  Respiratory:  Anterior/posterior lung sounds clear to auscultation bilaterally. Normal respiratory effort.  Musculoskeletal/Extremities: CRT <3, noclubbing, no cyanosis, no edema.  Psych: Mood and affect appropriate to situation  Neuro: No focal deficits. Sensation equal bilaterally. Normal tone and extraocular movements intact.    INVESTIGATIONS:  Lab Results   Component Value Date    SODIUM 135 02/10/2021    POTASSIUM 4.4 02/10/2021    BUN 31 (H) 02/10/2021    CREATININE 0.92 02/10/2021    WBC 15.0 (H) 02/10/2021    HCT 41.8 02/10/2021    HGB 14.3 02/10/2021    INR 1.1 2021     02/10/2021    RAPDTR 12.00 (HH) 02/10/2021    PTT 33 (H) 2021    GLUCOSE 101 (H) 02/10/2021    CHOLESTEROL 135 2021    HDL 52 2021    CALCLDL 58 2021    TRIGLYCERIDE 127 2021    MG 3.2 (H) 2021     Coronary Angiogram 2/3/2021  · Ost RCA to Mid RCA lesion with 5% stenosis.  · Prox Cx lesion with 80% stenosis.  · Prox LAD to Mid LAD lesion with 10%  stenosis.     Pre-op Dx: Cardiac arrest s/p resuscitation , inferolateral STEMI        Post-op Dx: Cardiac arrest s/p resuscitation , inferolateral STEMI 80 proximal circumflex lesion hazy , severe LV systolic dysfunction LVEF 20% , severely elevated LVEDP 40 mm Hg     Procedure: Left heart cath ,   selective coronary angiogram,   LV gram  PCI of proximal circumflex with 2.5x15 mm xience  stent post dilate with with 2.75 x15 NC balloon to pressure of 12  Insertion of IABP   DC shock for Ventricular tachycardia after LV gram     Echo Limited 2/6/2021 (EF 44%)  Limited echo.  Normal left ventricular chamber size. Mildly reduced left ventricular systolic function with ejection fraction of 44%.  Left ventricular regional wall motion abnormalities (see diagram).  Normal right ventricular chamber size. Normal right ventricular systolic function. RVSP=38mmHg.  No pericardial effusion.  Tiny mobile echodensity visualized on catheter which may represent fibrin deposit vs thrombi. (view 11).  Compared to prior study, 02/03/2020, LV and RV systolic function have significantly improved (Prior LVEF~10%)    MRI Cardiac 2/8/2021  IMPRESSION:  1.  Subacute transmural, likely nonviable lateral wall myocardial infarction with associated akinesis/dyskinesis and minimal residual edema.  Intramyocardial hemorrhage and persistent rest perfusion defect in the area of infarction compatible with microvascular obstruction.  2.  Acute pericarditis. No evidence of myocarditis.  3.  Delayed enhancement suggests infarction of the posterior papillary muscle.  4.  Background mild concentric left ventricular hypertrophy.  5.  Overall moderately reduced left ventricular systolic function, LVEF 35%. Preserved right ventricular function.  6.  Reduced cardiac index, 1.2 L/min/m2.     LVEF: 35%  RVEF: 47%  Background ECV: 23%     MR - LVEF  MR - RVEF  MR - ECV    Echo Limited 3/24/2021 (EF 50%)  Mild LV systolic dysfunction  Inferolateral  hypokinesis  LVEF has improved since last echo    ASSESSMENT:   1. Cardiac arrest with ventricular fibrillation (CMS/HCC)    2. Hypothermia, induced    3. Transaminitis    4. ST elevation myocardial infarction involving left circumflex coronary artery (CMS/HCC)    5. Hyperglycemia    6. On mechanically assisted ventilation (CMS/HCC)    7. Suspected COVID-19 virus infection    8. Bilateral pulmonary infiltrates on chest x-ray    9. Metabolic acidosis with respiratory acidosis    10. Coronary artery disease due to lipid rich plaque        PLAN:  CAD/Inferolateral STEMI: S/p PCI  of proximal circumflex with 2.5x15 mm xience stent post dilate with with 2.75 x15 NC balloon to pressure of 12 insertion of IABP. Limited echo 3/24/2021 showed inferolateral hypokinesis and improved EF of 55%. Denies any chest pain or shortness of breath. Continue on aspirin 81 mg, Brilinta 90 mg BID, Lipitor 20 mg nightly. Advised to increase physical activity as tolerated.   Ischemic cardiomyopathy: Cardiac MRI showed EF of 35%. Limited echo 3/24/2021 showed inferolateral hypokinesis and normalized EF of 55%. Clinically compensated. Continue current medication regimen.  Acute systolic and diastolic dysfunction: On lasix 20 mg prn for weight gain of over 3 lbs in 24 hours or 5 lbs in 72 hours with associated edema.    Hypertension: Well controlled, /64. On coreg 25 mg BID and losartan 50 mg daily. Advised low salt diet.   VT cardiac arrest recurrent monomorphic NSVT: Found down at home by his wife, daughter performed bystander CPR until EMS arrived.  He was given a total of 8mg narcan, 300 mg amiodarone, 3 rounds of EPI, and 5 shocks for VT, as well as bolus of NS during resuscitation prior to ROSC and transportation to ED.  Tobacco Use: Quit smoking. Congratulated on cessation.   COVID-19 infection/ Acute respiratory failure: During most recent hospitalization.  Acute encephalopathy in 2/2021  Return to clinic in 6 months.    Call or  return to clinic as needed if these symptoms worsen, fail to improve as anticipated, or if new symptoms develop.     On 06/23/21, IPayam scribed the services personally performed by Dillan Green MD.       I, Dillan Green, attest that I performed all of the work during this encounter and that the scribe only recorded my findings.      [facility]

## 2022-09-13 NOTE — DISCHARGE NOTE NURSING/CASE MANAGEMENT/SOCIAL WORK - NSDPACMPNY_GEN_ALL_CORE
Pt presents with weakness and fatigue- Pt is scheduled for outpatient transfusion tomorrow- EMS reports her HGB at 7 Platelets at 18. EMS reports family would like pt transfused in [facility] instead of [place] tomorrow.  Pt has Myeloma- states she is not receiving treatment
Family

## 2022-12-23 ENCOUNTER — OUTPATIENT (OUTPATIENT)
Dept: OUTPATIENT SERVICES | Facility: HOSPITAL | Age: 72
LOS: 1 days | Discharge: HOME | End: 2022-12-23

## 2022-12-23 ENCOUNTER — TRANSCRIPTION ENCOUNTER (OUTPATIENT)
Age: 72
End: 2022-12-23

## 2022-12-23 VITALS
DIASTOLIC BLOOD PRESSURE: 75 MMHG | OXYGEN SATURATION: 98 % | RESPIRATION RATE: 11 BRPM | HEART RATE: 95 BPM | SYSTOLIC BLOOD PRESSURE: 114 MMHG

## 2022-12-23 VITALS
WEIGHT: 259.93 LBS | HEIGHT: 69 IN | DIASTOLIC BLOOD PRESSURE: 93 MMHG | HEART RATE: 85 BPM | SYSTOLIC BLOOD PRESSURE: 149 MMHG | TEMPERATURE: 97 F | RESPIRATION RATE: 18 BRPM

## 2022-12-23 DIAGNOSIS — Z98.890 OTHER SPECIFIED POSTPROCEDURAL STATES: Chronic | ICD-10-CM

## 2022-12-23 DIAGNOSIS — Z95.5 PRESENCE OF CORONARY ANGIOPLASTY IMPLANT AND GRAFT: Chronic | ICD-10-CM

## 2022-12-23 NOTE — CHART NOTE - NSCHARTNOTEFT_GEN_A_CORE
PACU ANESTHESIA ADMISSION NOTE      Procedure: Colonoscopy  Post op diagnosis:  Diverticulosis, Hemorrhoids        __x__  Patent Airway    __x__  Full return of protective reflexes    _x___  Full recovery from anesthesia / back to baseline     Vitals:   T:  97.9         R:  20                BP:    108/56              Sat:     97%              P: 93      Mental Status:  __x__ Awake   _____ Alert   _____ Drowsy   _____ Sedated    Nausea/Vomiting:  ____ NO  ______Yes,   See Post - Op Orders          Pain Scale (0-10):  _____    Treatment: ____ None    ___x_ See Post - Op/PCA Orders    Post - Operative Fluids:   ____ Oral   __x__ See Post - Op Orders    Plan: Discharge:   __x__Home       _____Floor     _____Critical Care    _____  Other:_________________    Comments: Pt tolerated procedure well, no anesthesia related complications. Care of pt endorsed to PACU, report given to PACU RN. Discharge when criteria are met.

## 2022-12-23 NOTE — ASU DISCHARGE PLAN (ADULT/PEDIATRIC) - CALL YOUR DOCTOR IF YOU HAVE ANY OF THE FOLLOWING:
Bleeding that does not stop/Swelling that gets worse/Pain not relieved by Medications/Fever greater than (need to indicate Fahrenheit or Celsius)/Numbness, tingling, color or temperature change to extremity/Nausea and vomiting that does not stop/Unable to urinate/Excessive diarrhea/Increased irritability or sluggishness

## 2022-12-23 NOTE — ASU PATIENT PROFILE, ADULT - FALL HARM RISK - RISK INTERVENTIONS

## 2022-12-29 DIAGNOSIS — M48.00 SPINAL STENOSIS, SITE UNSPECIFIED: ICD-10-CM

## 2022-12-29 DIAGNOSIS — K57.30 DIVERTICULOSIS OF LARGE INTESTINE WITHOUT PERFORATION OR ABSCESS WITHOUT BLEEDING: ICD-10-CM

## 2022-12-29 DIAGNOSIS — I10 ESSENTIAL (PRIMARY) HYPERTENSION: ICD-10-CM

## 2022-12-29 DIAGNOSIS — Z79.02 LONG TERM (CURRENT) USE OF ANTITHROMBOTICS/ANTIPLATELETS: ICD-10-CM

## 2022-12-29 DIAGNOSIS — Z12.11 ENCOUNTER FOR SCREENING FOR MALIGNANT NEOPLASM OF COLON: ICD-10-CM

## 2022-12-29 DIAGNOSIS — Z95.5 PRESENCE OF CORONARY ANGIOPLASTY IMPLANT AND GRAFT: ICD-10-CM

## 2022-12-29 DIAGNOSIS — K64.4 RESIDUAL HEMORRHOIDAL SKIN TAGS: ICD-10-CM

## 2022-12-29 DIAGNOSIS — Z79.01 LONG TERM (CURRENT) USE OF ANTICOAGULANTS: ICD-10-CM

## 2022-12-29 DIAGNOSIS — I25.10 ATHEROSCLEROTIC HEART DISEASE OF NATIVE CORONARY ARTERY WITHOUT ANGINA PECTORIS: ICD-10-CM

## 2022-12-29 DIAGNOSIS — N40.0 BENIGN PROSTATIC HYPERPLASIA WITHOUT LOWER URINARY TRACT SYMPTOMS: ICD-10-CM

## 2022-12-29 DIAGNOSIS — Z86.711 PERSONAL HISTORY OF PULMONARY EMBOLISM: ICD-10-CM

## 2022-12-29 DIAGNOSIS — E78.5 HYPERLIPIDEMIA, UNSPECIFIED: ICD-10-CM

## 2023-01-30 NOTE — PHYSICAL THERAPY INITIAL EVALUATION ADULT - LEVEL OF CONSCIOUSNESS, REHAB EVAL
Recommended artificial tears to use: 1 drop 4x a day in both eyes.  If no improvement consider Plugs/Restasis. alert

## 2023-03-31 NOTE — ED PROVIDER NOTE - NS ED ROS FT
"Outgoing call to pt with wife answering phone, I requested to speak with pt, wife states that I can speak with her. Wife reports pt took medication aka mobic that Dr. John prescribed today, that was suppose to be for pain with pt breaking out in a rash and having trouble breathing but he needs a "pain medication that is suppose to treat just pain and that is for pain only and wants something else called in." I immediately stopped wife from talking with abruptly asking if pt is continuing and currently having trouble breathing with wife stating "he is but he is ok right now."  I instructed clearly that an ambulance needed to be called for assistance and/or pts wife needs to bring pt to the closest ER for further evaluation if pt can not breathe. Wife states "well I guess I will have to do that then". I informed pts wife that pt should not take another dose of Mobic until evaluated by a provider and that Dr. John is not in the office at this time but will make him aware of pts symptoms and request for a different prescription and will contact them on Monday.  Pts wife states "oh so you are going to just leave my  like this all weekend". I re advised that pt needs to be seen in nearest emergency room for further evaluation and possible treatment today as in this moment and if this medical advice is followed, then pt would be cared for until Dr. John is available. Wife says she guesses she will take pt to ER but told me to make sure I tell Dr. John that pt needs pain medication on Monday and to call back and let them know. Call ended warmly.-DL,LPN  " Constitutional: (-) fever  Eyes/ENT: (-) blurry vision, (-) epistaxis  Cardiovascular: (-) chest pain, (-) syncope  Respiratory: (-) cough, (-) shortness of breath  Gastrointestinal: (-) vomiting, (-) diarrhea  Musculoskeletal: (-) neck pain, (-) back pain, +leg pain  Integumentary: (-) rash, (-) edema  Neurological: (-) headache, (-) altered mental status  Psychiatric: (-) hallucinations  Allergic/Immunologic: (-) pruritus

## 2023-05-15 ENCOUNTER — INPATIENT (INPATIENT)
Facility: HOSPITAL | Age: 73
LOS: 0 days | Discharge: HOME | DRG: 552 | End: 2023-05-16
Attending: HOSPITALIST | Admitting: HOSPITALIST
Payer: COMMERCIAL

## 2023-05-15 VITALS
HEIGHT: 69 IN | TEMPERATURE: 98 F | OXYGEN SATURATION: 93 % | HEART RATE: 55 BPM | WEIGHT: 250 LBS | DIASTOLIC BLOOD PRESSURE: 74 MMHG | SYSTOLIC BLOOD PRESSURE: 140 MMHG

## 2023-05-15 DIAGNOSIS — Z98.890 OTHER SPECIFIED POSTPROCEDURAL STATES: Chronic | ICD-10-CM

## 2023-05-15 DIAGNOSIS — R32 UNSPECIFIED URINARY INCONTINENCE: ICD-10-CM

## 2023-05-15 DIAGNOSIS — Z95.5 PRESENCE OF CORONARY ANGIOPLASTY IMPLANT AND GRAFT: Chronic | ICD-10-CM

## 2023-05-15 LAB
ALBUMIN SERPL ELPH-MCNC: 3.8 G/DL — SIGNIFICANT CHANGE UP (ref 3.5–5.2)
ALP SERPL-CCNC: 68 U/L — SIGNIFICANT CHANGE UP (ref 30–115)
ALT FLD-CCNC: 13 U/L — SIGNIFICANT CHANGE UP (ref 0–41)
ANION GAP SERPL CALC-SCNC: 10 MMOL/L — SIGNIFICANT CHANGE UP (ref 7–14)
APPEARANCE UR: CLEAR — SIGNIFICANT CHANGE UP
AST SERPL-CCNC: 16 U/L — SIGNIFICANT CHANGE UP (ref 0–41)
BASOPHILS # BLD AUTO: 0.09 K/UL — SIGNIFICANT CHANGE UP (ref 0–0.2)
BASOPHILS NFR BLD AUTO: 1 % — SIGNIFICANT CHANGE UP (ref 0–1)
BILIRUB SERPL-MCNC: 1 MG/DL — SIGNIFICANT CHANGE UP (ref 0.2–1.2)
BILIRUB UR-MCNC: NEGATIVE — SIGNIFICANT CHANGE UP
BUN SERPL-MCNC: 18 MG/DL — SIGNIFICANT CHANGE UP (ref 10–20)
CALCIUM SERPL-MCNC: 8.4 MG/DL — SIGNIFICANT CHANGE UP (ref 8.4–10.5)
CHLORIDE SERPL-SCNC: 106 MMOL/L — SIGNIFICANT CHANGE UP (ref 98–110)
CO2 SERPL-SCNC: 22 MMOL/L — SIGNIFICANT CHANGE UP (ref 17–32)
COLOR SPEC: YELLOW — SIGNIFICANT CHANGE UP
CREAT SERPL-MCNC: 0.9 MG/DL — SIGNIFICANT CHANGE UP (ref 0.7–1.5)
DIFF PNL FLD: NEGATIVE — SIGNIFICANT CHANGE UP
EGFR: 91 ML/MIN/1.73M2 — SIGNIFICANT CHANGE UP
EOSINOPHIL # BLD AUTO: 0.18 K/UL — SIGNIFICANT CHANGE UP (ref 0–0.7)
EOSINOPHIL NFR BLD AUTO: 2 % — SIGNIFICANT CHANGE UP (ref 0–8)
GLUCOSE SERPL-MCNC: 98 MG/DL — SIGNIFICANT CHANGE UP (ref 70–99)
GLUCOSE UR QL: NEGATIVE — SIGNIFICANT CHANGE UP
HCT VFR BLD CALC: 46.4 % — SIGNIFICANT CHANGE UP (ref 42–52)
HGB BLD-MCNC: 15.1 G/DL — SIGNIFICANT CHANGE UP (ref 14–18)
IMM GRANULOCYTES NFR BLD AUTO: 1.4 % — HIGH (ref 0.1–0.3)
KETONES UR-MCNC: NEGATIVE — SIGNIFICANT CHANGE UP
LEUKOCYTE ESTERASE UR-ACNC: NEGATIVE — SIGNIFICANT CHANGE UP
LYMPHOCYTES # BLD AUTO: 1.32 K/UL — SIGNIFICANT CHANGE UP (ref 1.2–3.4)
LYMPHOCYTES # BLD AUTO: 14.6 % — LOW (ref 20.5–51.1)
MCHC RBC-ENTMCNC: 31.1 PG — HIGH (ref 27–31)
MCHC RBC-ENTMCNC: 32.5 G/DL — SIGNIFICANT CHANGE UP (ref 32–37)
MCV RBC AUTO: 95.7 FL — HIGH (ref 80–94)
MONOCYTES # BLD AUTO: 0.89 K/UL — HIGH (ref 0.1–0.6)
MONOCYTES NFR BLD AUTO: 9.8 % — HIGH (ref 1.7–9.3)
NEUTROPHILS # BLD AUTO: 6.43 K/UL — SIGNIFICANT CHANGE UP (ref 1.4–6.5)
NEUTROPHILS NFR BLD AUTO: 71.2 % — SIGNIFICANT CHANGE UP (ref 42.2–75.2)
NITRITE UR-MCNC: NEGATIVE — SIGNIFICANT CHANGE UP
NRBC # BLD: 0 /100 WBCS — SIGNIFICANT CHANGE UP (ref 0–0)
PH UR: 6 — SIGNIFICANT CHANGE UP (ref 5–8)
PLATELET # BLD AUTO: 219 K/UL — SIGNIFICANT CHANGE UP (ref 130–400)
PMV BLD: 9.7 FL — SIGNIFICANT CHANGE UP (ref 7.4–10.4)
POTASSIUM SERPL-MCNC: 4.3 MMOL/L — SIGNIFICANT CHANGE UP (ref 3.5–5)
POTASSIUM SERPL-SCNC: 4.3 MMOL/L — SIGNIFICANT CHANGE UP (ref 3.5–5)
PROT SERPL-MCNC: 6.1 G/DL — SIGNIFICANT CHANGE UP (ref 6–8)
PROT UR-MCNC: SIGNIFICANT CHANGE UP
RBC # BLD: 4.85 M/UL — SIGNIFICANT CHANGE UP (ref 4.7–6.1)
RBC # FLD: 14 % — SIGNIFICANT CHANGE UP (ref 11.5–14.5)
SODIUM SERPL-SCNC: 138 MMOL/L — SIGNIFICANT CHANGE UP (ref 135–146)
SP GR SPEC: 1.03 — HIGH (ref 1.01–1.03)
UROBILINOGEN FLD QL: SIGNIFICANT CHANGE UP
WBC # BLD: 9.04 K/UL — SIGNIFICANT CHANGE UP (ref 4.8–10.8)
WBC # FLD AUTO: 9.04 K/UL — SIGNIFICANT CHANGE UP (ref 4.8–10.8)

## 2023-05-15 PROCEDURE — G0378: CPT

## 2023-05-15 PROCEDURE — 99285 EMERGENCY DEPT VISIT HI MDM: CPT

## 2023-05-15 PROCEDURE — 99223 1ST HOSP IP/OBS HIGH 75: CPT

## 2023-05-15 PROCEDURE — 85025 COMPLETE CBC W/AUTO DIFF WBC: CPT

## 2023-05-15 PROCEDURE — 72148 MRI LUMBAR SPINE W/O DYE: CPT

## 2023-05-15 PROCEDURE — 83735 ASSAY OF MAGNESIUM: CPT

## 2023-05-15 PROCEDURE — 76770 US EXAM ABDO BACK WALL COMP: CPT

## 2023-05-15 PROCEDURE — 80053 COMPREHEN METABOLIC PANEL: CPT

## 2023-05-15 RX ORDER — APIXABAN 2.5 MG/1
5 TABLET, FILM COATED ORAL EVERY 12 HOURS
Refills: 0 | Status: DISCONTINUED | OUTPATIENT
Start: 2023-05-15 | End: 2023-05-16

## 2023-05-15 RX ORDER — FINASTERIDE 5 MG/1
5 TABLET, FILM COATED ORAL DAILY
Refills: 0 | Status: DISCONTINUED | OUTPATIENT
Start: 2023-05-15 | End: 2023-05-16

## 2023-05-15 RX ORDER — METOPROLOL TARTRATE 50 MG
1 TABLET ORAL
Refills: 0 | DISCHARGE

## 2023-05-15 RX ORDER — CLOPIDOGREL BISULFATE 75 MG/1
75 TABLET, FILM COATED ORAL DAILY
Refills: 0 | Status: DISCONTINUED | OUTPATIENT
Start: 2023-05-15 | End: 2023-05-16

## 2023-05-15 RX ORDER — CLOPIDOGREL BISULFATE 75 MG/1
1 TABLET, FILM COATED ORAL
Qty: 0 | Refills: 0 | DISCHARGE

## 2023-05-15 RX ORDER — ATORVASTATIN CALCIUM 80 MG/1
1 TABLET, FILM COATED ORAL
Qty: 0 | Refills: 0 | DISCHARGE

## 2023-05-15 RX ORDER — TRAMADOL HYDROCHLORIDE 50 MG/1
50 TABLET ORAL EVERY 6 HOURS
Refills: 0 | Status: DISCONTINUED | OUTPATIENT
Start: 2023-05-15 | End: 2023-05-16

## 2023-05-15 RX ORDER — GABAPENTIN 400 MG/1
400 CAPSULE ORAL ONCE
Refills: 0 | Status: COMPLETED | OUTPATIENT
Start: 2023-05-15 | End: 2023-05-15

## 2023-05-15 RX ORDER — FAMOTIDINE 10 MG/ML
20 INJECTION INTRAVENOUS DAILY
Refills: 0 | Status: DISCONTINUED | OUTPATIENT
Start: 2023-05-15 | End: 2023-05-16

## 2023-05-15 RX ORDER — TAMSULOSIN HYDROCHLORIDE 0.4 MG/1
1 CAPSULE ORAL
Qty: 0 | Refills: 0 | DISCHARGE

## 2023-05-15 RX ORDER — FAMOTIDINE 10 MG/ML
1 INJECTION INTRAVENOUS
Refills: 0 | DISCHARGE

## 2023-05-15 RX ORDER — GABAPENTIN 400 MG/1
0 CAPSULE ORAL
Qty: 0 | Refills: 0 | DISCHARGE

## 2023-05-15 RX ORDER — METOPROLOL TARTRATE 50 MG
0 TABLET ORAL
Qty: 0 | Refills: 0 | DISCHARGE

## 2023-05-15 RX ORDER — DRONEDARONE 400 MG/1
400 TABLET, FILM COATED ORAL
Refills: 0 | Status: DISCONTINUED | OUTPATIENT
Start: 2023-05-15 | End: 2023-05-16

## 2023-05-15 RX ORDER — LIDOCAINE 4 G/100G
1 CREAM TOPICAL ONCE
Refills: 0 | Status: COMPLETED | OUTPATIENT
Start: 2023-05-15 | End: 2023-05-15

## 2023-05-15 RX ORDER — ATORVASTATIN CALCIUM 80 MG/1
80 TABLET, FILM COATED ORAL AT BEDTIME
Refills: 0 | Status: DISCONTINUED | OUTPATIENT
Start: 2023-05-15 | End: 2023-05-16

## 2023-05-15 RX ORDER — GABAPENTIN 400 MG/1
600 CAPSULE ORAL
Refills: 0 | Status: DISCONTINUED | OUTPATIENT
Start: 2023-05-15 | End: 2023-05-16

## 2023-05-15 RX ORDER — KETOROLAC TROMETHAMINE 30 MG/ML
30 SYRINGE (ML) INJECTION ONCE
Refills: 0 | Status: DISCONTINUED | OUTPATIENT
Start: 2023-05-15 | End: 2023-05-15

## 2023-05-15 RX ORDER — TAMSULOSIN HYDROCHLORIDE 0.4 MG/1
0.4 CAPSULE ORAL AT BEDTIME
Refills: 0 | Status: DISCONTINUED | OUTPATIENT
Start: 2023-05-15 | End: 2023-05-16

## 2023-05-15 RX ORDER — METHOCARBAMOL 500 MG/1
750 TABLET, FILM COATED ORAL ONCE
Refills: 0 | Status: COMPLETED | OUTPATIENT
Start: 2023-05-15 | End: 2023-05-15

## 2023-05-15 RX ORDER — APIXABAN 2.5 MG/1
1 TABLET, FILM COATED ORAL
Qty: 0 | Refills: 0 | DISCHARGE

## 2023-05-15 RX ORDER — FUROSEMIDE 40 MG
20 TABLET ORAL DAILY
Refills: 0 | Status: DISCONTINUED | OUTPATIENT
Start: 2023-05-15 | End: 2023-05-16

## 2023-05-15 RX ORDER — FINASTERIDE 5 MG/1
0 TABLET, FILM COATED ORAL
Qty: 0 | Refills: 0 | DISCHARGE

## 2023-05-15 RX ORDER — DRONEDARONE 400 MG/1
1 TABLET, FILM COATED ORAL
Refills: 0 | DISCHARGE

## 2023-05-15 RX ORDER — ACETAMINOPHEN 500 MG
650 TABLET ORAL EVERY 6 HOURS
Refills: 0 | Status: DISCONTINUED | OUTPATIENT
Start: 2023-05-15 | End: 2023-05-16

## 2023-05-15 RX ORDER — GABAPENTIN 400 MG/1
1 CAPSULE ORAL
Refills: 0 | DISCHARGE

## 2023-05-15 RX ORDER — FAMOTIDINE 10 MG/ML
1 INJECTION INTRAVENOUS
Qty: 0 | Refills: 0 | DISCHARGE

## 2023-05-15 RX ORDER — FINASTERIDE 5 MG/1
1 TABLET, FILM COATED ORAL
Refills: 0 | DISCHARGE

## 2023-05-15 RX ORDER — FUROSEMIDE 40 MG
1 TABLET ORAL
Refills: 0 | DISCHARGE

## 2023-05-15 RX ORDER — METOPROLOL TARTRATE 50 MG
100 TABLET ORAL DAILY
Refills: 0 | Status: DISCONTINUED | OUTPATIENT
Start: 2023-05-15 | End: 2023-05-16

## 2023-05-15 RX ADMIN — METHOCARBAMOL 750 MILLIGRAM(S): 500 TABLET, FILM COATED ORAL at 08:59

## 2023-05-15 RX ADMIN — LIDOCAINE 1 PATCH: 4 CREAM TOPICAL at 08:58

## 2023-05-15 RX ADMIN — DRONEDARONE 400 MILLIGRAM(S): 400 TABLET, FILM COATED ORAL at 21:03

## 2023-05-15 RX ADMIN — Medication 30 MILLIGRAM(S): at 08:59

## 2023-05-15 RX ADMIN — Medication 30 MILLIGRAM(S): at 09:25

## 2023-05-15 RX ADMIN — APIXABAN 5 MILLIGRAM(S): 2.5 TABLET, FILM COATED ORAL at 21:02

## 2023-05-15 RX ADMIN — Medication 650 MILLIGRAM(S): at 23:47

## 2023-05-15 RX ADMIN — Medication 650 MILLIGRAM(S): at 23:48

## 2023-05-15 RX ADMIN — LIDOCAINE 1 PATCH: 4 CREAM TOPICAL at 21:48

## 2023-05-15 RX ADMIN — LIDOCAINE 1 PATCH: 4 CREAM TOPICAL at 19:47

## 2023-05-15 RX ADMIN — GABAPENTIN 600 MILLIGRAM(S): 400 CAPSULE ORAL at 21:02

## 2023-05-15 RX ADMIN — Medication 650 MILLIGRAM(S): at 18:27

## 2023-05-15 RX ADMIN — ATORVASTATIN CALCIUM 80 MILLIGRAM(S): 80 TABLET, FILM COATED ORAL at 21:02

## 2023-05-15 RX ADMIN — TAMSULOSIN HYDROCHLORIDE 0.4 MILLIGRAM(S): 0.4 CAPSULE ORAL at 21:02

## 2023-05-15 NOTE — H&P ADULT - NSHPLABSRESULTS_GEN_ALL_CORE
15.1   9.04  )-----------( 219      ( 15 May 2023 09:04 )             46.4   05-15    138  |  106  |  18  ----------------------------<  98  4.3   |  22  |  0.9    Ca    8.4      15 May 2023 09:04    TPro  6.1  /  Alb  3.8  /  TBili  1.0  /  DBili  x   /  AST  16  /  ALT  13  /  AlkPhos  68  05-15

## 2023-05-15 NOTE — ED PROVIDER NOTE - PROGRESS NOTE DETAILS
Resident MDM: 72-year-old male history of spinal stenosis here for worsening back pain and intermittent urinary incontinence for the past month.  Postvoid ED POCUS ultrasound showed approximately 400 cc.  Consulted neurosurgery who will see patient after imaging.  Had shared decision-making with wife through phone.  Patient amenable to admission.  Rest of labs and urine reassuring-Authored by Caio Griffin pt post void ~400 cc, nsx consulted, will follow- SD

## 2023-05-15 NOTE — H&P ADULT - ASSESSMENT
72-year-old male wit PMHx of bilateral PEs/Bilateral DVTs on Eliquis. Afib, HLD, BPH, cervical stenosis and lumbar stenosis coming in here for 3 days of severe back pain.    # Uncontrolled Back pain   # Cervical/Spinal stenosis  Lower back pain radiating to bilateral LE. Has a history of worsening spinal stenosis with no previous interventions (saw a NS ~3years ago who referred him to pain management, patient did not follow up with pain management)  - Suspected progression of spinal stenosis, no alarming symptoms. Uses Cane for walking at baseline   - Will get Lumbo/Sacral MRI with no contrast >>>  After obtaining Result possible NS consult   - Pain Control: Tylenol and Tramadol PRN  - c/w Gabapentin 600mg BID    - Consult Pain management and PT   - Monitor worsening of symptoms     # Hx of DVTs/PEs  - Hx of Bilateral PEs with Bilateral DVTs > 3years ago   - Patient has been following up with hematologist outpatient   - On chronic Eliquis     # CAD s/p PCI with PEPITO   # Afib   # HLD   - c/w with plavix and Statin therapy for CAD   - c/w wit Eliquis, metoprol and multaq for Afib   - Lipid profile in AM   - Rate controlled     # BPH   - Patient has history of BPH and /bedside Ultrasound had shown elevated postvoid residue in ED   - Will obtain official Renal/Bladder ultrasound  - c/w Finasteride and flomax  - Bladder scan Q6 hrs     #DVT - Eliquis  #GI - pepcid  #Diet - DASH  #Activity - IAT   #Code - Full code     Disposition: Comes from home, uses Cane at baseline for ambulating   Med/Rec: Completed bedside with patient

## 2023-05-15 NOTE — H&P ADULT - NSHPPHYSICALEXAM_GEN_ALL_CORE
T(C): 36.6 (05-15-23 @ 07:52), Max: 36.6 (05-15-23 @ 07:52)  HR: 55 (05-15-23 @ 07:52) (55 - 55)  BP: 140/74 (05-15-23 @ 07:52) (140/74 - 140/74)  RR: --  SpO2: 93% (05-15-23 @ 07:52) (93% - 93%)    CONSTITUTIONAL: Well groomed, no apparent distress  RESP: No respiratory distress, no use of accessory muscles; CTA b/l, no WRR  CV: RRR, +S1S2, no MRG; no JVD; no peripheral edema  GI: Soft, NT, ND, no rebound, no guarding; no palpable masses; no hepatosplenomegaly; no hernia palpated  PSYCH: Appropriate insight/judgment; A+O x 3, mood and affect appropriate, recent/remote memory intact T(C): 36.6 (05-15-23 @ 07:52), Max: 36.6 (05-15-23 @ 07:52)  HR: 55 (05-15-23 @ 07:52) (55 - 55)  BP: 140/74 (05-15-23 @ 07:52) (140/74 - 140/74)  RR: --  SpO2: 93% (05-15-23 @ 07:52) (93% - 93%)    CONSTITUTIONAL: Well groomed, no apparent distress  RESP: No respiratory distress, no use of accessory muscles; CTA b/l, no WRR  CV: RRR, +S1S2, no MRG; no JVD; no peripheral edema  GI: Soft, NT, ND, no rebound, no guarding; no palpable masses  PSYCH: Appropriate insight/judgment; A+O x 3

## 2023-05-15 NOTE — ED ADULT TRIAGE NOTE - HEIGHT IN FEET
Problem: Labor (Cervical Ripen, Induct, Augment) (Adult,Obstetrics,Pediatric)  Goal: Signs and Symptoms of Listed Potential Problems Will be Absent or Manageable (Labor)  Signs and symptoms of listed potential problems will be absent or manageable by discharge/transition of care (reference Labor (Cervical Ripen, Induct, Augment) (Adult,Obstetrics,Pediatric) CPG).   Outcome: Improving  Pt felt increased pain in her lower back, lower/right abdomen. RN emptied bladder, repositioned, heat applied. MDA came to rebolus epidural. SVE after this was 9/100/0 per RN. MARK ANTHONY Hogan CNM notified. She became more comfortable after epidural bolus. FHR category 1. Ctx palpate moderate. Educated on pushing, 2h postpartum recovery, and  transitioning.  and SO at bedside. Pt drinking and having popsicle. Anticipate .       5

## 2023-05-15 NOTE — ED PROVIDER NOTE - OBJECTIVE STATEMENT
72-year-old male history of PE on Eliquis BPH hypertension hyperlipidemia cervical and lumbar stenosis coming in here for 3 days of severe back pain radiating down legs as well as urinary incontinence intermittently for the past month.  Denies IV drug use fevers chills no other complaints

## 2023-05-15 NOTE — H&P ADULT - HISTORY OF PRESENT ILLNESS
72-year-old male history of PE on Eliquis BPH hypertension hyperlipidemia cervical and lumbar stenosis coming in here for 3 days of severe back pain radiating down legs as well as urinary incontinence intermittently for the past month.  Denies IV drug use fevers chills no other complaints.     Vital Signs Last 24 Hrs  T(C): 36.6 (15 May 2023 07:52), Max: 36.6 (15 May 2023 07:52)  T(F): 97.8 (15 May 2023 07:52), Max: 97.8 (15 May 2023 07:52)  HR: 55 (15 May 2023 07:52) (55 - 55)  BP: 140/74 (15 May 2023 07:52) (140/74 - 140/74)  BP(mean): --  RR: --  SpO2: 93% (15 May 2023 07:52) (93% - 93%)    Parameters below as of 15 May 2023 07:52  Patient On (Oxygen Delivery Method): room air     72-year-old male wit PMHx of bilateral PEs/Bilateral DVTs on Eliquis. Afib, HLD, BPH, cervical stenosis and lumbar stenosis coming in here for 3 days of severe back pain. Patient had recently travelled to [place] after which the pain started. HE mentions having chronic back pain due to his spinal stenosis but never to this extent. Patient has been unable to lay down or even sleep due to the pain, Pain is sharp and electric and radiates to both legs but worse on Left leg. Denies any febrile illness, weakness or numbness noted; mention sometimes having urine leakage but this has been mostly present with his prostate issues but no fecal incontinence. Denies any nausea or vomiting, incontinence, fever, abnormal movement, saddle anesthesia or urinary symptoms     Vital Signs Last 24 Hrs  T(C): 36.6 (15 May 2023 07:52), Max: 36.6 (15 May 2023 07:52)  T(F): 97.8 (15 May 2023 07:52), Max: 97.8 (15 May 2023 07:52)  HR: 55 (15 May 2023 07:52) (55 - 55)  BP: 140/74 (15 May 2023 07:52) (140/74 - 140/74)  BP(mean): --  RR: --  SpO2: 93% (15 May 2023 07:52) (93% - 93%)    Parameters below as of 15 May 2023 07:52  Patient On (Oxygen Delivery Method): room air

## 2023-05-15 NOTE — ED PROVIDER NOTE - CONSIDERATION OF ADMISSION OBSERVATION
Patient to be admitted for neurosurgery bowel and MRI concerning for cord compression Consideration of Admission/Observation

## 2023-05-15 NOTE — ED ADULT NURSE NOTE - OBJECTIVE STATEMENT
pt came in c/o lower back pain mostly on the left side. pt  states he always had chronic back pain but today pain was really severe radiating to L leg.

## 2023-05-15 NOTE — ED PROVIDER NOTE - CLINICAL SUMMARY MEDICAL DECISION MAKING FREE TEXT BOX
72-year-old male past medical history of PE on Eliquis BPH hyperlipidemia CAD status post 1 stent chronic back pain does not see a neurosurgeon presents with back pain rating to the left side worse than before unable to walk due to pain.  Of note patient stated that he had moments where he could not make it to the bathroom "in time" for months, no sensory deficit Well appearing, NAD, non toxic. NCAT PERRLA EOMI neck supple non tender normal wob cta bl rrr abdomen s nt nd no rebound no guarding WWPx4 muscle strength 5 out of 5 bilaterally +left paraspinal.  Rule out cord compression however most likely secondary to BPH muscle strength 5 out of 5.    Pain control reassess

## 2023-05-15 NOTE — ED PROVIDER NOTE - IV ALTEPLASE DOOR HIDDEN
39 y/o male patient is presenting to the Emergency Department with a request to have COVID-19 testing.  Denies symptoms, including cough, shortness of breath, fever, chills, bodyaches or sore throat. show

## 2023-05-15 NOTE — ED PROVIDER NOTE - PHYSICAL EXAMINATION
CONSTITUTIONAL:  in no acute distress.   SKIN: warm, dry  HEAD: Normocephalic; atraumatic.  EYES: PERRL, EOMI, normal sclera and conjunctiva   ENT: No nasal discharge; airway clear.  NECK: Supple; non tender.  CARD:  Regular rate and rhythm.   RESP: NO inc WOB   ABD: soft ntnd  EXT: Normal ROM.  + straight leg raise on left  LYMPH: No acute cervical adenopathy.  NEURO: Alert, oriented, grossly unremarkable, intermittent urinary incontince  PSYCH: Cooperative, appropriate.

## 2023-05-15 NOTE — H&P ADULT - ATTENDING COMMENTS
Patient is a 71 y/o male with PMH of HTN, CAD, Dyslipidemia, BPH, Severe DJD of spine, Spine stenosis, Afib s/p Ablation 2 weeks ago. h/o PE/DVT on Eliquis tx. c/w progressive thoracic and lumbar back pain with radiation to left leg and to a lesser extend to right leg, Patient reports difficulty moving out of bed, unable to ambulate due to pain. Patient denies any recent falls or trauma, no change in legs sensation, no change in urination. Patient ambulated inside the house w/o assistance, uses cane to ambulate short distances. Patient admitted to medical unit for further evaluation and tx.    # Severe thoraco/lumbar pain with radiation to left lower ext  most likely due to progression of spinal stenosis with suspected left sided sciatica   # Severe DJD of C/T/L spine   - no sensory or motor changes on b/l lower ext, mild urinary retention due to BPH  - admit to medicine  - obtain MRI of C/T/L spine- once results are available , most likely pt. will need neurosx. eval  - pain management consult   -resume on Gabapentin 600 mg po twice daily   - PT eval     # h/o BPH with urinary retention  - obtain renal/bladder scan  - bedside bladder scan every 6 hours , straight cath prn  - resume on flomax, finasteride tx.    # h/o PE/DVT  # h/o Afib s/p ablation 2 weeks ago  - resume on Eliquis, dronedarone , Metoprolol tx./ homer regimen meds tx.     # h/o HTN/Dyslipidemia/CAD- continue home regimen tx.     # GI proph- PPI/DVT proph- pt. is on Eliquis tx.     Code  status: full code    Total time spent to complete patient's bedside assessment, review medical chart, discuss medical plan of care with covering medical team was more than 75 minutes with >50% of time spent face to face with patient, discussion with patient/family and/or coordination of care

## 2023-05-15 NOTE — PATIENT PROFILE ADULT - FALL HARM RISK - HARM RISK INTERVENTIONS

## 2023-05-15 NOTE — H&P ADULT - NSICDXPASTMEDICALHX_GEN_ALL_CORE_FT
PAST MEDICAL HISTORY:  BPH (benign prostatic hyperplasia)     CAD (coronary artery disease)     Hyperlipidemia     Hypertension     Pulmonary embolus     Spinal stenosis

## 2023-05-16 ENCOUNTER — TRANSCRIPTION ENCOUNTER (OUTPATIENT)
Age: 73
End: 2023-05-16

## 2023-05-16 VITALS
OXYGEN SATURATION: 96 % | DIASTOLIC BLOOD PRESSURE: 78 MMHG | TEMPERATURE: 98 F | SYSTOLIC BLOOD PRESSURE: 118 MMHG | RESPIRATION RATE: 20 BRPM | HEART RATE: 62 BPM

## 2023-05-16 DIAGNOSIS — M54.50 LOW BACK PAIN, UNSPECIFIED: ICD-10-CM

## 2023-05-16 LAB
ALBUMIN SERPL ELPH-MCNC: 3.8 G/DL — SIGNIFICANT CHANGE UP (ref 3.5–5.2)
ALP SERPL-CCNC: 65 U/L — SIGNIFICANT CHANGE UP (ref 30–115)
ALT FLD-CCNC: 11 U/L — SIGNIFICANT CHANGE UP (ref 0–41)
ANION GAP SERPL CALC-SCNC: 9 MMOL/L — SIGNIFICANT CHANGE UP (ref 7–14)
AST SERPL-CCNC: 14 U/L — SIGNIFICANT CHANGE UP (ref 0–41)
BASOPHILS # BLD AUTO: 0.08 K/UL — SIGNIFICANT CHANGE UP (ref 0–0.2)
BASOPHILS NFR BLD AUTO: 1 % — SIGNIFICANT CHANGE UP (ref 0–1)
BILIRUB SERPL-MCNC: 1 MG/DL — SIGNIFICANT CHANGE UP (ref 0.2–1.2)
BUN SERPL-MCNC: 19 MG/DL — SIGNIFICANT CHANGE UP (ref 10–20)
CALCIUM SERPL-MCNC: 8.6 MG/DL — SIGNIFICANT CHANGE UP (ref 8.4–10.5)
CHLORIDE SERPL-SCNC: 107 MMOL/L — SIGNIFICANT CHANGE UP (ref 98–110)
CO2 SERPL-SCNC: 24 MMOL/L — SIGNIFICANT CHANGE UP (ref 17–32)
CREAT SERPL-MCNC: 1 MG/DL — SIGNIFICANT CHANGE UP (ref 0.7–1.5)
EGFR: 80 ML/MIN/1.73M2 — SIGNIFICANT CHANGE UP
EOSINOPHIL # BLD AUTO: 0.24 K/UL — SIGNIFICANT CHANGE UP (ref 0–0.7)
EOSINOPHIL NFR BLD AUTO: 3 % — SIGNIFICANT CHANGE UP (ref 0–8)
GLUCOSE SERPL-MCNC: 86 MG/DL — SIGNIFICANT CHANGE UP (ref 70–99)
HCT VFR BLD CALC: 44.1 % — SIGNIFICANT CHANGE UP (ref 42–52)
HGB BLD-MCNC: 14.7 G/DL — SIGNIFICANT CHANGE UP (ref 14–18)
IMM GRANULOCYTES NFR BLD AUTO: 1.2 % — HIGH (ref 0.1–0.3)
LYMPHOCYTES # BLD AUTO: 1.5 K/UL — SIGNIFICANT CHANGE UP (ref 1.2–3.4)
LYMPHOCYTES # BLD AUTO: 18.7 % — LOW (ref 20.5–51.1)
MAGNESIUM SERPL-MCNC: 2.2 MG/DL — SIGNIFICANT CHANGE UP (ref 1.8–2.4)
MCHC RBC-ENTMCNC: 32.3 PG — HIGH (ref 27–31)
MCHC RBC-ENTMCNC: 33.3 G/DL — SIGNIFICANT CHANGE UP (ref 32–37)
MCV RBC AUTO: 96.9 FL — HIGH (ref 80–94)
MONOCYTES # BLD AUTO: 0.86 K/UL — HIGH (ref 0.1–0.6)
MONOCYTES NFR BLD AUTO: 10.7 % — HIGH (ref 1.7–9.3)
NEUTROPHILS # BLD AUTO: 5.23 K/UL — SIGNIFICANT CHANGE UP (ref 1.4–6.5)
NEUTROPHILS NFR BLD AUTO: 65.4 % — SIGNIFICANT CHANGE UP (ref 42.2–75.2)
NRBC # BLD: 0 /100 WBCS — SIGNIFICANT CHANGE UP (ref 0–0)
PLATELET # BLD AUTO: 204 K/UL — SIGNIFICANT CHANGE UP (ref 130–400)
PMV BLD: 9.4 FL — SIGNIFICANT CHANGE UP (ref 7.4–10.4)
POTASSIUM SERPL-MCNC: 4 MMOL/L — SIGNIFICANT CHANGE UP (ref 3.5–5)
POTASSIUM SERPL-SCNC: 4 MMOL/L — SIGNIFICANT CHANGE UP (ref 3.5–5)
PROT SERPL-MCNC: 6 G/DL — SIGNIFICANT CHANGE UP (ref 6–8)
RBC # BLD: 4.55 M/UL — LOW (ref 4.7–6.1)
RBC # FLD: 14.2 % — SIGNIFICANT CHANGE UP (ref 11.5–14.5)
SODIUM SERPL-SCNC: 140 MMOL/L — SIGNIFICANT CHANGE UP (ref 135–146)
WBC # BLD: 8.01 K/UL — SIGNIFICANT CHANGE UP (ref 4.8–10.8)
WBC # FLD AUTO: 8.01 K/UL — SIGNIFICANT CHANGE UP (ref 4.8–10.8)

## 2023-05-16 PROCEDURE — 76770 US EXAM ABDO BACK WALL COMP: CPT | Mod: 26

## 2023-05-16 PROCEDURE — 99232 SBSQ HOSP IP/OBS MODERATE 35: CPT

## 2023-05-16 PROCEDURE — 72148 MRI LUMBAR SPINE W/O DYE: CPT | Mod: 26

## 2023-05-16 PROCEDURE — 99223 1ST HOSP IP/OBS HIGH 75: CPT

## 2023-05-16 PROCEDURE — 99233 SBSQ HOSP IP/OBS HIGH 50: CPT

## 2023-05-16 RX ORDER — CYCLOBENZAPRINE HYDROCHLORIDE 10 MG/1
1 TABLET, FILM COATED ORAL
Qty: 90 | Refills: 0
Start: 2023-05-16 | End: 2023-06-14

## 2023-05-16 RX ORDER — CYCLOBENZAPRINE HYDROCHLORIDE 10 MG/1
5 TABLET, FILM COATED ORAL EVERY 8 HOURS
Refills: 0 | Status: DISCONTINUED | OUTPATIENT
Start: 2023-05-16 | End: 2023-05-16

## 2023-05-16 RX ADMIN — FAMOTIDINE 20 MILLIGRAM(S): 10 INJECTION INTRAVENOUS at 11:45

## 2023-05-16 RX ADMIN — GABAPENTIN 600 MILLIGRAM(S): 400 CAPSULE ORAL at 06:01

## 2023-05-16 RX ADMIN — Medication 650 MILLIGRAM(S): at 06:01

## 2023-05-16 RX ADMIN — CLOPIDOGREL BISULFATE 75 MILLIGRAM(S): 75 TABLET, FILM COATED ORAL at 11:45

## 2023-05-16 RX ADMIN — Medication 650 MILLIGRAM(S): at 11:46

## 2023-05-16 RX ADMIN — Medication 20 MILLIGRAM(S): at 06:01

## 2023-05-16 RX ADMIN — Medication 650 MILLIGRAM(S): at 17:59

## 2023-05-16 RX ADMIN — Medication 100 MILLIGRAM(S): at 06:01

## 2023-05-16 RX ADMIN — APIXABAN 5 MILLIGRAM(S): 2.5 TABLET, FILM COATED ORAL at 09:15

## 2023-05-16 RX ADMIN — Medication 650 MILLIGRAM(S): at 06:17

## 2023-05-16 RX ADMIN — FINASTERIDE 5 MILLIGRAM(S): 5 TABLET, FILM COATED ORAL at 11:45

## 2023-05-16 RX ADMIN — GABAPENTIN 600 MILLIGRAM(S): 400 CAPSULE ORAL at 17:58

## 2023-05-16 RX ADMIN — DRONEDARONE 400 MILLIGRAM(S): 400 TABLET, FILM COATED ORAL at 09:14

## 2023-05-16 NOTE — DISCHARGE NOTE PROVIDER - HOSPITAL COURSE
72M PMHx h/o b/l PEs/Bilateral DVTs on Eliquis, Afib, HLD, BPH, and cervical stenosis and lumbar stenosis presents for severe back pain after a long flight from [place] for 3 days.    #Acute on Chronic  Back pain  # Hx of Cervical/Spinal stenosis  Lower back pain radiating to bilateral LE. Has a history of worsening spinal stenosis with no previous interventions (saw a NS ~3years ago who referred him to pain management, patient did not follow up with pain management)  - Suspected progression of spinal stenosis, no alarming symptoms. Uses Cane for walking at baseline   - Lumbo/Sacral MRI showed interval progression of multilevel spondylosis and spondylolisthesis and severe L4-5 w/ grade 1 anterolisthesis resulting in severe spinal and bilateral foraminal stenosis.   Degenerative endplate marrow edema in L4-5 and L1-2. Super endplate height loss of L5.  - f/u Neurosurgery consult --> no intervention. f/u outpatient  - Pain Control: Tylenol and Tramadol PRN  - c/w Gabapentin 600mg BID    - evaluated by Pain management       #Hx of DVTs/PEs  - Hx of Bilateral PEs with Bilateral DVTs > 3years ago   - Patient has been following up with hematologist outpatient   - On chronic Eliquis    #CAD s/p PCI with PEPITO   # Chronic Afib   #HLD   - c/w with plavix and Statin therapy for CAD   - c/w wit Eliquis, metoprol and multaq for Afib     #BPH   patient urinating. normal kidney function  - c/w Finasteride and flomax      patient is hemodynamically stable and ready for discharge with outpatient neurosurgery follow up . He is independent at baseline uses a cane at times.   72M PMHx h/o b/l PEs/Bilateral DVTs on Eliquis, Afib, HLD, BPH, and cervical stenosis and lumbar stenosis presents for severe back pain after a long flight from [place] for 3 days.    #Acute on Chronic  Back pain  # Hx of Cervical/Spinal stenosis  Lower back pain radiating to bilateral LE. Has a history of worsening spinal stenosis with no previous interventions (saw a NS ~3years ago who referred him to pain management, patient did not follow up with pain management)  - Suspected progression of spinal stenosis, no alarming symptoms. Uses Cane for walking at baseline   - Lumbo/Sacral MRI showed interval progression of multilevel spondylosis and spondylolisthesis and severe L4-5 w/ grade 1 anterolisthesis resulting in severe spinal and bilateral foraminal stenosis.   Degenerative endplate marrow edema in L4-5 and L1-2. Super endplate height loss of L5.  - f/u Neurosurgery consult --> no intervention. f/u outpatient  - Pain Control: Tylenol and Tramadol PRN  - c/w Gabapentin 600mg BID    - evaluated by Pain management       #Hx of DVTs/PEs  - Hx of Bilateral PEs with Bilateral DVTs > 3years ago   - Patient has been following up with hematologist outpatient   - On chronic Eliquis    #CAD s/p PCI with PEPITO   # Chronic Afib   #HLD   - c/w with plavix and Statin therapy for CAD   - c/w wit Eliquis, metoprol and multaq for Afib     #BPH   patient urinating. normal kidney function  - c/w Finasteride and flomax    patient is hemodynamically stable and ready for discharge with outpatient neurosurgery follow up . He is independent at baseline uses a cane at times.

## 2023-05-16 NOTE — CONSULT NOTE ADULT - ASSESSMENT
Patient is a 73 y/o man with history of PEs/DVTs on anticoagulation, Afib, HL, BPH, CAD, HTN, obesity, and cervical/lumbar stenosis who was admitted on 5/15/2023 with low back pain.

## 2023-05-16 NOTE — DISCHARGE NOTE PROVIDER - NSDCCPCAREPLAN_GEN_ALL_CORE_FT
PRINCIPAL DISCHARGE DIAGNOSIS  Diagnosis: Urinary incontinence  Assessment and Plan of Treatment: #Acute on Chronic  Back pain  # Hx of Cervical/Spinal stenosis  Lower back pain radiating to bilateral LE.   no alarming symptoms. Uses Cane for walking at baseline   - Lumbo/Sacral MRI showed interval progression of multilevel spondylosis and spondylolisthesis and severe L4-5 w/ grade 1 anterolisthesis resulting in severe spinal and bilateral foraminal stenosis.   Degenerative endplate marrow edema in L4-5 and L1-2. Super endplate height loss of L5.  - Neurosurgery consult --> no intervention. f/u outpatient  - evaluated by Pain management   follow up outpatient with pain management  continue your meds as prescribed      SECONDARY DISCHARGE DIAGNOSES  Diagnosis: Back pain  Assessment and Plan of Treatment:

## 2023-05-16 NOTE — CONSULT NOTE ADULT - PROBLEM SELECTOR RECOMMENDATION 9
No history of chronic opioid therapy. Low risk of opioid abuse per ORT.  1) Would defer any interventional procedure at this time  2) Start cyclobenzaprine 5mg Q8h standing  3) Continue gabapentin 600mg BID  4) Continue acetaminophen 650mg Q6h standing  5) Continue tramadol 50mg Q6h prn; may discharge with 3-5 day supply

## 2023-05-16 NOTE — DISCHARGE NOTE NURSING/CASE MANAGEMENT/SOCIAL WORK - NSDCPEFALRISK_GEN_ALL_CORE
For information on Fall & Injury Prevention, visit: [email or web address] OR  [email or web address] OR  https://www.cdc.gov/steadi/patient.html

## 2023-05-16 NOTE — CONSULT NOTE ADULT - CONSULT REASON
lumbar stenosis associated with L4L5 marrow edema   Assessment for possible intervention
low back pain

## 2023-05-16 NOTE — CHART NOTE - NSCHARTNOTEFT_GEN_A_CORE
Patient wants to be discharged home today.  He was evaluated by neurosurgery today, I confirmed verbally over the phone that he is safe to be discharged with outpatient follow up with them.  Patient doesn't need physical therapy assessment, he is fully independent and he ambulated independently with the nurse and the PCA.  He is voiding with normal urine output, no retention and no incontinence.  Confirmed with Dr Mayes: patient is safe to be discharged with outpatient follow up with neurosurgery and pain management.

## 2023-05-16 NOTE — DISCHARGE NOTE PROVIDER - CARE PROVIDER_API CALL
Azul Carr)  Neurosurgery  [address] [place] [address], [address]  [place] [address]  Phone: [phone]  Fax: [phone]  Follow Up Time:    Azul Carr)  Neurosurgery  [address] [place] [address], [address]  [place] [address]  Phone: [phone]  Fax: [phone]  Follow Up Time:     Xavier Bartlett; MPH)  Anesthesiology; Pain Medicine  [address] [place] [address]  Phone: [phone]  Fax: [phone]  Follow Up Time:

## 2023-05-16 NOTE — DISCHARGE NOTE PROVIDER - CARE PROVIDERS DIRECT ADDRESSES
,[email or web address],DirectAddress_Unknown

## 2023-05-16 NOTE — PROGRESS NOTE ADULT - ASSESSMENT
72M PMHx h/o b/l PEs/Bilateral DVTs on Eliquis, Afib, HLD, BPH, and cervical stenosis and lumbar stenosis presents for severe back pain after a long flight from [place] for 3 days.    #Acute on Chronic  Back pain  # Hx of Cervical/Spinal stenosis  Lower back pain radiating to bilateral LE. Has a history of worsening spinal stenosis with no previous interventions (saw a NS ~3years ago who referred him to pain management, patient did not follow up with pain management)  - Suspected progression of spinal stenosis, no alarming symptoms. Uses Cane for walking at baseline   - Lumbo/Sacral MRI showed interval progression of multilevel spondylosis and spondylolisthesis and severe L4-5 w/ grade 1 anterolisthesis resulting in severe spinal and bilateral foraminal stenosis.   Degenerative endplate marrow edema in L4-5 and L1-2. Super endplate height loss of L5.  - f/u Neurosurgery consult  - Pain Control: Tylenol and Tramadol PRN  - c/w Gabapentin 600mg BID    - Consult Pain management and PT  - Monitor worsening of symptoms    #Hx of DVTs/PEs  - Hx of Bilateral PEs with Bilateral DVTs > 3years ago   - Patient has been following up with hematologist outpatient   - On chronic Eliquis    #CAD s/p PCI with PEPITO   # Chronic Afib   #HLD   - c/w with plavix and Statin therapy for CAD   - c/w wit Eliquis, metoprol and multaq for Afib   - Lipid profile in AM   - Rate controlled     #BPH   - Patient has history of BPH and /bedside Ultrasound had shown elevated postvoid residue in ED   - Will obtain official Renal/Bladder ultrasound  - c/w Finasteride and flomax  - Bladder scan Q6 hrs     #DVT - Eliquis  #GI - pepcid  #Diet - DASH/TLC  #Activity - AAT, uses cane at baseline, f/u PT  #Code - Full code   #Dispo - from home    Pending: Neuro Sx/ pain mgmt/ PT  Plan d/w the pt at bedside  Dispo: [facility]

## 2023-05-16 NOTE — CONSULT NOTE ADULT - SUBJECTIVE AND OBJECTIVE BOX
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 Afib, HLD, BPH, cervical stenosis and lumbar stenosis coming in here for 3 days of severe back pain. Pt with h/o chronic back pain due to his spinal stenosis but never to this extent. Patient has been unable to lay down or even sleep due to the pain, Pain is sharp and electric and radiates to both legs but worse on Left leg. Denies any febrile illness, weakness or numbness noted; mention sometimes having urine leakage but this has been mostly present with his prostate issues but no fecal incontinence. Denies any nausea or vomiting, incontinence, fever, abnormal movement, saddle anesthesia or urinary symptoms. Pt was seen by pain management. Requesting to follow up with Dr. Lovelace as o/p. Pt refusing any Neurosurgical intervention at this time. Sts hes had PT and CAROLINA's in the past but did not provide any significant relief. Pt saw a Neurosurgeon last year who recommended CAR[facility]INA and possible Surgery but he did not follow up.     Vital Signs Last 24 Hrs  T(C): 36.6 (15 May 2023 07:52), Max: 36.6 (15 May 2023 07:52)  T(F): 97.8 (15 May 2023 07:52), Max: 97.8 (15 May 2023 07:52)  HR: 55 (15 May 2023 07:52) (55 - 55)  BP: 140/74 (15 May 2023 07:52) (140/74 - 140/74)  BP(mean): --  RR: --  SpO2: 93% (15 May 2023 07:52) (93% - 93%)    Parameters below as of 15 May 2023 07:52  Patient On (Oxygen Delivery Method): room air     (15 May 2023 15:18)        PAST MEDICAL & SURGICAL HISTORY:  Hypertension  Hyperlipidemia  Spinal stenosis  BPH (benign prostatic hyperplasia)  Pulmonary embolus  CAD (coronary artery disease)  H/O heart artery stent  History of ankle surgery  left      Home Medications:  Eliquis 5 mg oral tablet: 1 tab(s) orally 2 times a day (15 May 2023 16:08)  finasteride 5 mg oral tablet: 1 tab(s) orally once a day (15 May 2023 16:07)  Flomax 0.4 mg oral capsule: 1 cap(s) orally once a day (15 May 2023 16:08)  furosemide 20 mg oral tablet: 1 tab(s) orally once a day (15 May 2023 16:06)  gabapentin 600 mg oral tablet: 1 orally 2 times a day (15 May 2023 16:06)  Lipitor 80 mg oral tablet: 1 tab(s) orally once a day (15 May 2023 16:08)  metoprolol succinate 100 mg oral capsule, extended release: 1 cap(s) orally once a day (15 May 2023 16:06)  Multaq 400 mg oral tablet: 1 tab(s) orally 2 times a day (15 May 2023 16:07)  Pepcid 20 mg oral tablet: 1 tab(s) orally 2 times a day (15 May 2023 16:08)  Plavix 75 mg oral tablet: 1 tab(s) orally once a day (15 May 2023 16:08)      Allergies    anti fungal (Headache)    Intolerances    ROS:  [X] A ten-point review of systems is negative except as noted   [  ] Due to altered mental status/intubation, subjective information were not able to be obtained from the patient. History was obtained, to the extent possible, from review of the chart and collateral sources of information    MEDICATIONS  (STANDING):  acetaminophen     Tablet .. 650 milliGRAM(s) Oral every 6 hours  apixaban 5 milliGRAM(s) Oral every 12 hours  atorvastatin 80 milliGRAM(s) Oral at bedtime  clopidogrel Tablet 75 milliGRAM(s) Oral daily  cyclobenzaprine 5 milliGRAM(s) Oral every 8 hours  dronedarone 400 milliGRAM(s) Oral two times a day  famotidine    Tablet 20 milliGRAM(s) Oral daily  finasteride 5 milliGRAM(s) Oral daily  furosemide    Tablet 20 milliGRAM(s) Oral daily  gabapentin 600 milliGRAM(s) Oral two times a day  metoprolol succinate  milliGRAM(s) Oral daily  tamsulosin 0.4 milliGRAM(s) Oral at bedtime    MEDICATIONS  (PRN):  traMADol 50 milliGRAM(s) Oral every 6 hours PRN Moderate Pain (4 - 6)      ICU Vital Signs Last 24 Hrs  T(C): 36.6 (16 May 2023 13:00), Max: 36.7 (16 May 2023 05:00)  T(F): 97.9 (16 May 2023 13:00), Max: 98 (16 May 2023 05:00)  HR: 62 (16 May 2023 13:00) (57 - 62)  BP: 118/78 (16 May 2023 13:00) (115/89 - 118/78)  BP(mean): --  ABP: --  ABP(mean): --  RR: 20 (16 May 2023 13:00) (18 - 20)  SpO2: 96% (16 May 2023 13:00) (96% - 96%)    I&O's Detail                         14.7   8.01  )-----------( 204      ( 16 May 2023 04:30 )             44.1     05-    140  |  107  |  19  ----------------------------<  86  4.0   |  24  |  1.0    Ca    8.6      16 May 2023 04:30  Mg     2.2     05-16    TPro  6.0  /  Alb  3.8  /  TBili  1.0  /  DBili  x   /  AST  14  /  ALT  11  /  AlkPhos  65  05-16        Urinalysis Basic - ( 15 May 2023 10:05 )    Color: Yellow / Appearance: Clear / S.031 / pH: x  Gluc: x / Ketone: Negative  / Bili: Negative / Urobili: <2 mg/dL   Blood: x / Protein: Trace / Nitrite: Negative   Leuk Esterase: Negative / RBC: x / WBC x   Sq Epi: x / Non Sq Epi: x / Bacteria: x        On PE:    Strength 5/5 B/L LE  Sensation equal and intact to light touch  KJ decr B/L  No clonus  No LROM      Radiology:  < from: MR Lumbar Spine No Cont (. @ 11:00) >  Interval progression of multilevelspondylosis and spondylolisthesis   since the prior lumbar MRI from 2016, most severe at L4-5 with   progressed grade 1 anterolisthesis resulting in severe spinal and   bilateral foraminal stenosis.    Interval progression of degenerative endplate marrow edema at L4-5 and   L1-2 (Modic type I).    Mild superior endplate height loss of L5, likely degenerative.    < end of copied text >      Assessment:  As above    Plan:  Patient does not want any Neurosurgical intervention at this time  Can follow up with Dr. Carr as o/p
Pain Medicine Consult Note    History of Present Illness  Patient is a 71 y/o man with history of PEs/DVTs on anticoagulation, Afib, HL, BPH, CAD, HTN, obesity, and cervical/lumbar stenosis who was admitted on 5/15/2023 with low back pain. The patient states that he has had intermittent low back pain for many years but notes that it became very severe 1-2 days prior to admission. No history of trauma or focal injury. The patient states that he started having severe pain across the low back with radiation to the left anterior thigh. Pain is worse with standing and walking and improved with sitting and laying flat. No focal numbness or weakness in the lower extremities. He has had intermittent issues with urinary urge incontinence but no bowel incontinence or saddle anesthesia. The patient has had treatment with ESIs in the past without relief. No history of chronic opioid therapy. He is currently prescribed gabapentin 600mg BID.     Current Inpatient Medication Regimen:  acetaminophen     Tablet .. 650 milliGRAM(s) Oral every 6 hours  apixaban 5 milliGRAM(s) Oral every 12 hours  atorvastatin 80 milliGRAM(s) Oral at bedtime  clopidogrel Tablet 75 milliGRAM(s) Oral daily  dronedarone 400 milliGRAM(s) Oral two times a day  famotidine    Tablet 20 milliGRAM(s) Oral daily  finasteride 5 milliGRAM(s) Oral daily  furosemide    Tablet 20 milliGRAM(s) Oral daily  gabapentin 600 milliGRAM(s) Oral two times a day  metoprolol succinate  milliGRAM(s) Oral daily  tamsulosin 0.4 milliGRAM(s) Oral at bedtime  traMADol 50 milliGRAM(s) Oral every 6 hours PRN      Home Analgesic Regimen:  gabapentin 600mg BID  acetaminophen prn    Allergies:  anti fungal (Headache)      Past Medical History:  Hypertension  Hyperlipidemia  Spinal stenosis  BPH (benign prostatic hyperplasia)  Pulmonary embolus/DVT  CAD (coronary artery disease)  BPH  Atrial fibrillation    Past Surgical History:  Coronary stent placement  Left ankle surgery    Family History:  Gastric cancer (Father)    Social History:  Tobacco - denies  EtOH - denies  Drugs - denies      Review of Systems:  General: no fevers or chills  Eyes: no diplopia or blurred vision  ENT: no rhinorrhea  CV: no chest pain  Resp: no cough or dyspnea  GI: no abdominal pain, constipation, or diarrhea  : +urinary urge incontinence  Neuro: no focal weakness or numbness in extremities  Psych: no depression or anxiety    Physical Exam:  T(C): 36.6 (05-16-23 @ 13:00), Max: 36.7 (05-16-23 @ 05:00)  HR: 62 (05-16-23 @ 13:00) (57 - 86)  BP: 118/78 (05-16-23 @ 13:00) (110/61 - 118/78)  RR: 20 (05-16-23 @ 13:00) (18 - 20)  SpO2: 96% (05-16-23 @ 13:00) (95% - 96%)  Gen: NAD  Eyes: no glasses or scleral icterus  Head: Normocephalic / Atraumatic  CV: no JVD  Lungs: nonlabored breathing  Abdomen: nondistended, soft  : no kasper catheter in place  Back: +tenderness to palpation in the bilateral low lumbar facet region  Neuro: AOx3, Cranial nerves intact  Extremities: full ROM in upper/lower extremities  Psych: normal affect      Labs:  CBC  8.01 K/uL [4.80 - 10.80] > 14.7 g/dL [14.0 - 18.0] / 44.1 % [42.0 - 52.0] < 204 K/uL [130 - 400]      BMP  140 mmol/L [135 - 146] | 107 mmol/L [98 - 110] | 19 mg/dL [10 - 20]  4.0 mmol/L [3.5 - 5.0] | 24 mmol/L [17 - 32] | 1.0 mg/dL [0.7 - 1.5]    86 mg/dL [70 - 99]    Imaging Studies:  MRI Lumbar Spine (5/16/2023)  FINDINGS:    The last well-formed disc space will be designated as L5-S1 for the   purpose of this report.    ALIGNMENT: Side progressed grade 1 anterolisthesis of L4 on L5. Slightly   increased mild retrolisthesis of T12 on L1, L1 on L2, and L2 on L3.    VERTEBRAE: New mild superior endplate height loss of L5, likely   degenerative in etiology. The remaining vertebral heights are preserved.   Interval progression of moderate degenerative endplate marrow edema at   L4-5 and mild degenerative endplate edema at L1-2 (Modic type I). Mild   degenerative endplate fatty marrow change at L5-S1 (Modic type II).    DISCS: Increased severe disc space narrowing at L1-2 and L5-S1.    CONUS MEDULLARIS AND CAUDA EQUINA: The conus medullaris terminates at L1.   There is normal appearance of the conus medullaris and cauda equina.    PARAVERTEBRAL SOFT TISSUES AND VISUALIZED RETROPERITONEUM: Moderate fatty   atrophy of the paraspinal muscles.    EVALUATION OF INDIVIDUAL LEVELS:  T12-L1:  New shallow disc bulge and mild bilateral facet arthropathy   without spinal or foraminal stenosis.    L1-2: Increased uncovered shallow disc bulge and mild bilateral facet   arthropathy contributing to mild spinal stenosis, and moderate right/mild   left foraminal stenosis.    L2-3: Increased uncovered shallow disc bulge and superimposed left   foraminal disc protrusion, mild ligamentum flavum thickening, and   moderate bilateral facet arthropathy contributing to mild, and   moderate-severe left/mild right foraminal stenosis.    L3-4: Unchanged disc bulge with superimposed left paracentral/foraminal   disc protrusion, mild ligamentum flavum thickening, and moderate   bilateral facet arthropathy contributing to moderate-severe spinal   stenosis, and moderate bilateral foraminal stenosis.    L4-5: Increased uncovered disc bulge, and marked bilateral facet   arthropathy contributing to severe spinal stenosis, and severe bilateral   foraminal stenosis with flattening of the exiting L4 nerve roots.    L5-S1: Slightly decreased disc bulge osteophyte complex and moderate   bilateral facet arthropathy contributing to mild spinal stenosis, and   moderate-severe bilateral foraminal stenosis.    LIMITED EVALUATION OF UPPER SACRUM AND SACROILIAC JOINTS: Unremarkable.    IMPRESSION:    Interval progression of multilevel spondylosis and spondylolisthesis   since the prior lumbar MRI from 7/31/2016, most severe at L4-5 with   progressed grade 1 anterolisthesis resulting in severe spinal and   bilateral foraminal stenosis.    Interval progression of degenerative endplate marrow edema at L4-5 and   L1-2 (Modic type I).    Mild superior endplate height loss of L5, likely degenerative.      Opioid Risk Assessment Tool                                                                         Female       Male  Family History  Alcohol                                                              1                3  Illegal drugs                                                       2                3  Rx drugs                                                            4                4    Personal History   Alcohol                                                              3                3  Illegal drugs                                                       4                4  Rx drugs                                                            5                5    Age between 16—45 years                                1                1  History of preadolescent sexual abuse               3                0    Psychological disease  ADD, OCD, bipolar, schizophrenia                      2                2  Depression                                                       1                1    Total Score                                                      __              0    0 - 3 = low risk for future opioid abuse  4 - 7 = moderate risk for future opioid abuse  8+ = high risk for future opioid abuse

## 2023-05-16 NOTE — PROGRESS NOTE ADULT - SUBJECTIVE AND OBJECTIVE BOX
RACHELMATTIE HERNÁNDEZPH  72y  Male      Patient is a 72y old  Male who presents with a chief complaint of Spinal Stenosis       INTERVAL HPI/OVERNIGHT EVENTS:      ******************************* REVIEW OF SYSTEMS:**********************************************    All other review of systems negative    *********************** VITALS ******************************************    T(F): 98 (23 @ 05:00)  HR: 57 (23 @ 05:00) (57 - 86)  BP: 115/89 (23 @ 05:00) (110/61 - 115/89)  RR: 18 (23 @ 05:00) (18 - 18)  SpO2: 95% (05-15-23 @ 18:00) (95% - 95%)            ******************************** PHYSICAL EXAM:**************************************************  GENERAL: NAD    PSYCH: no agitation, baseline mentation  HEENT:     NERVOUS SYSTEM:  Alert & Oriented X3,     PULMONARY: MARCUS, CTA    CARDIOVASCULAR: S1S2 RRR    GI: Soft, NT, ND; BS present.    EXTREMITIES:  2+ Peripheral Pulses, No clubbing, cyanosis, or edema    LYMPH: No lymphadenopathy noted    SKIN: No rashes or lesions      **************************** LABS *******************************************************                          14.7   8.01  )-----------( 204      ( 16 May 2023 04:30 )             44.1         140  |  107  |  19  ----------------------------<  86  4.0   |  24  |  1.0    Ca    8.6      16 May 2023 04:30  Mg     2.2         TPro  6.0  /  Alb  3.8  /  TBili  1.0  /  DBili  x   /  AST  14  /  ALT  11  /  AlkPhos  65        Urinalysis Basic - ( 15 May 2023 10:05 )    Color: Yellow / Appearance: Clear / S.031 / pH: x  Gluc: x / Ketone: Negative  / Bili: Negative / Urobili: <2 mg/dL   Blood: x / Protein: Trace / Nitrite: Negative   Leuk Esterase: Negative / RBC: x / WBC x   Sq Epi: x / Non Sq Epi: x / Bacteria: x        Lactate Trend        CAPILLARY BLOOD GLUCOSE              **************************Active Medications *******************************************  anti fungal (Headache)      acetaminophen     Tablet .. 650 milliGRAM(s) Oral every 6 hours  apixaban 5 milliGRAM(s) Oral every 12 hours  atorvastatin 80 milliGRAM(s) Oral at bedtime  clopidogrel Tablet 75 milliGRAM(s) Oral daily  dronedarone 400 milliGRAM(s) Oral two times a day  famotidine    Tablet 20 milliGRAM(s) Oral daily  finasteride 5 milliGRAM(s) Oral daily  furosemide    Tablet 20 milliGRAM(s) Oral daily  gabapentin 600 milliGRAM(s) Oral two times a day  metoprolol succinate  milliGRAM(s) Oral daily  tamsulosin 0.4 milliGRAM(s) Oral at bedtime  traMADol 50 milliGRAM(s) Oral every 6 hours PRN      ***************************************************  RADIOLOGY & ADDITIONAL TESTS:    Imaging Personally Reviewed:  [ ] YES  [ ] NO    HEALTH ISSUES - PROBLEM Dx:

## 2023-05-16 NOTE — DISCHARGE NOTE NURSING/CASE MANAGEMENT/SOCIAL WORK - PATIENT PORTAL LINK FT
You can access the FollowMyHealth Patient Portal offered by [facility] by registering at the following website: [email or web address]. By joining [facility]’s FollowMyHealth portal, you will also be able to view your health information using other applications (apps) compatible with our system.

## 2023-05-16 NOTE — PROGRESS NOTE ADULT - SUBJECTIVE AND OBJECTIVE BOX
SUBJECTIVE:    Patient is a 72y old Male who presents with a chief complaint of Back pain (15 May 2023 15:18)    Currently admitted to medicine with the primary diagnosis of: Spinal Stenosis    Today is hospital day 1d.     Overnight Events:     no significant overnight events    PAST MEDICAL & SURGICAL HISTORY  Hypertension    Hyperlipidemia    Spinal stenosis    BPH (benign prostatic hyperplasia)    Pulmonary embolus    CAD (coronary artery disease)    H/O heart artery stent    History of ankle surgery  left      SOCIAL HISTORY:  no significant social hx    ALLERGIES:  anti fungal (Headache)    MEDICATIONS:  STANDING MEDICATIONS  acetaminophen     Tablet .. 650 milliGRAM(s) Oral every 6 hours  apixaban 5 milliGRAM(s) Oral every 12 hours  atorvastatin 80 milliGRAM(s) Oral at bedtime  clopidogrel Tablet 75 milliGRAM(s) Oral daily  dronedarone 400 milliGRAM(s) Oral two times a day  famotidine    Tablet 20 milliGRAM(s) Oral daily  finasteride 5 milliGRAM(s) Oral daily  furosemide    Tablet 20 milliGRAM(s) Oral daily  gabapentin 600 milliGRAM(s) Oral two times a day  metoprolol succinate  milliGRAM(s) Oral daily  tamsulosin 0.4 milliGRAM(s) Oral at bedtime    PRN MEDICATIONS  traMADol 50 milliGRAM(s) Oral every 6 hours PRN    VITALS:   ICU Vital Signs Last 24 Hrs  T(C): 36.7 (16 May 2023 05:00), Max: 36.7 (16 May 2023 05:00)  T(F): 98 (16 May 2023 05:00), Max: 98 (16 May 2023 05:00)  HR: 57 (16 May 2023 05:00) (57 - 86)  BP: 115/89 (16 May 2023 05:00) (110/61 - 115/89)  RR: 18 (16 May 2023 05:00) (18 - 18)  SpO2: 95% (15 May 2023 18:00) (95% - 95%)    O2 Parameters below as of 15 May 2023 18:00  Patient On (Oxygen Delivery Method): room air      LABS:                        14.7   8.01  )-----------( 204      ( 16 May 2023 04:30 )             44.1     05-16    140  |  107  |  19  ----------------------------<  86  4.0   |  24  |  1.0    Ca    8.6      16 May 2023 04:30  Mg     2.2     -    TPro  6.0  /  Alb  3.8  /  TBili  1.0  /  DBili  x   /  AST  14  /  ALT  11  /  AlkPhos  65  05-      Urinalysis Basic - ( 15 May 2023 10:05 )    Color: Yellow / Appearance: Clear / S.031 / pH: x  Gluc: x / Ketone: Negative  / Bili: Negative / Urobili: <2 mg/dL   Blood: x / Protein: Trace / Nitrite: Negative   Leuk Esterase: Negative / RBC: x / WBC x   Sq Epi: x / Non Sq Epi: x / Bacteria: x      RADIOLOGY:    < from: Xray Chest 2 Views PA/Lat (21 @ 13:16) >    EXAM:  XR CHEST PA LAT 2V            PROCEDURE DATE:  2021        INTERPRETATION:  Clinical History / Reason for exam: Cough    Comparison : Chest radiograph 2020.    Technique/Positioning: Frontal and lateral chest radiographs    Findings:    Support devices: None.    Cardiac/mediastinum/hilum: Enlarged cardiac silhouette. Uncoiled aorta.    Lung parenchyma/Pleura: Low lung volumes. No focal pulmonary consolidation pleural effusion or pneumothorax.    Skeleton/soft tissues: Degenerative changes of the spine    Impression:    Low lung volumes. No focal pulmonary consolidation.      PHYSICAL EXAM:  GEN: in NAD  HEENT: NCAT, b/l hearing aids in place  LUNGS: clear to ascultation b/l, no active wheezing or rhonchi  HEART: normal rate, irregular rhythm  ABD: soft, nontender, nondistended, +BS, no guarding  EXT: no edema in b/l LEs, 5/5 strength in b/l LEs and UEs  NEURO: A&Ox3 SUBJECTIVE:    Patient is a 72y old Male who presents with a chief complaint of Back pain (15 May 2023 15:18)    Currently admitted to medicine with the primary diagnosis of: Spinal Stenosis    Today is hospital day 1d.     Overnight Events:     no significant overnight events    PAST MEDICAL & SURGICAL HISTORY  Hypertension    Hyperlipidemia    Spinal stenosis    BPH (benign prostatic hyperplasia)    Pulmonary embolus    CAD (coronary artery disease)    H/O heart artery stent    History of ankle surgery  left      SOCIAL HISTORY:  no significant social hx    ALLERGIES:  anti fungal (Headache)    MEDICATIONS:  STANDING MEDICATIONS  acetaminophen     Tablet .. 650 milliGRAM(s) Oral every 6 hours  apixaban 5 milliGRAM(s) Oral every 12 hours  atorvastatin 80 milliGRAM(s) Oral at bedtime  clopidogrel Tablet 75 milliGRAM(s) Oral daily  dronedarone 400 milliGRAM(s) Oral two times a day  famotidine    Tablet 20 milliGRAM(s) Oral daily  finasteride 5 milliGRAM(s) Oral daily  furosemide    Tablet 20 milliGRAM(s) Oral daily  gabapentin 600 milliGRAM(s) Oral two times a day  metoprolol succinate  milliGRAM(s) Oral daily  tamsulosin 0.4 milliGRAM(s) Oral at bedtime    PRN MEDICATIONS  traMADol 50 milliGRAM(s) Oral every 6 hours PRN    VITALS:   ICU Vital Signs Last 24 Hrs  T(C): 36.7 (16 May 2023 05:00), Max: 36.7 (16 May 2023 05:00)  T(F): 98 (16 May 2023 05:00), Max: 98 (16 May 2023 05:00)  HR: 57 (16 May 2023 05:00) (57 - 86)  BP: 115/89 (16 May 2023 05:00) (110/61 - 115/89)  RR: 18 (16 May 2023 05:00) (18 - 18)  SpO2: 95% (15 May 2023 18:00) (95% - 95%)    O2 Parameters below as of 15 May 2023 18:00  Patient On (Oxygen Delivery Method): room air      LABS:                        14.7   8.01  )-----------( 204      ( 16 May 2023 04:30 )             44.1     05-16    140  |  107  |  19  ----------------------------<  86  4.0   |  24  |  1.0    Ca    8.6      16 May 2023 04:30  Mg     2.2         TPro  6.0  /  Alb  3.8  /  TBili  1.0  /  DBili  x   /  AST  14  /  ALT  11  /  AlkPhos  65        Urinalysis Basic - ( 15 May 2023 10:05 )    Color: Yellow / Appearance: Clear / S.031 / pH: x  Gluc: x / Ketone: Negative  / Bili: Negative / Urobili: <2 mg/dL   Blood: x / Protein: Trace / Nitrite: Negative   Leuk Esterase: Negative / RBC: x / WBC x   Sq Epi: x / Non Sq Epi: x / Bacteria: x      RADIOLOGY:    < from: Xray Chest 2 Views PA/Lat (21 @ 13:16) >    EXAM:  XR CHEST PA LAT 2V            PROCEDURE DATE:  2021        INTERPRETATION:  Clinical History / Reason for exam: Cough    Comparison : Chest radiograph 2020.    Technique/Positioning: Frontal and lateral chest radiographs    Findings:    Support devices: None.    Cardiac/mediastinum/hilum: Enlarged cardiac silhouette. Uncoiled aorta.    Lung parenchyma/Pleura: Low lung volumes. No focal pulmonary consolidation pleural effusion or pneumothorax.    Skeleton/soft tissues: Degenerative changes of the spine    Impression:    Low lung volumes. No focal pulmonary consolidation.                < from: MR Lumbar Spine No Cont (23 @ 11:00) >    ACC: [id number] EXAM:  MR SPINE LUMBAR   ORDERED BY: VIRGINIA COBB     PROCEDURE DATE:  2023      INTERPRETATION:  CLINICAL INFORMATION: Spinal stenosis.    ADDITIONAL CLINICAL INFORMATION: Not Applicable    TECHNIQUE: Multiplanar multisequence MRI of the lumbar spine was   performed without the administration of intravenous contrast, according   to standard protocol.    COMPARISON: MRI lumbar spine dated 2016.    FINDINGS:    The last well-formed disc space will be designated as L5-S1 for the   purpose of this report.    ALIGNMENT: Side progressed grade 1 anterolisthesis of L4 on L5. Slightly   increased mild retrolisthesis of T12 on L1, L1 on L2, and L2 on L3.    VERTEBRAE: New mild superior endplate height loss of L5, likely   degenerative in etiology. The remaining vertebral heights are preserved.   Interval progression of moderate degenerative endplate marrow edema at   L4-5 and mild degenerative endplate edema at L1-2 (Modic type I). Mild   degenerative endplate fatty marrow change at L5-S1 (Modic type II).    DISCS: Increased severe disc space narrowing at L1-2 and L5-S1.    CONUS MEDULLARIS AND CAUDA EQUINA: The conus medullaris terminates at L1.   There is normal appearance of the conus medullaris and cauda equina.    PARAVERTEBRAL SOFT TISSUES AND VISUALIZED RETROPERITONEUM: Moderate fatty   atrophy of the paraspinal muscles.    EVALUATION OF INDIVIDUAL LEVELS:  T12-L1:  New shallow disc bulge and mild bilateral facet arthropathy   without spinal or foraminal stenosis.    L1-2: Increased uncovered shallow disc bulge and mild bilateral facet   arthropathy contributing to mild spinal stenosis, and moderate right/mild   left foraminal stenosis.    L2-3: Increased uncovered shallow disc bulge and superimposed left   foraminal disc protrusion, mild ligamentum flavum thickening, and   moderate bilateral facet arthropathy contributing to mild, and   moderate-severe left/mild right foraminal stenosis.    L3-4: Unchanged disc bulge with superimposed left paracentral/foraminal   disc protrusion, mild ligamentum flavum thickening, and moderate   bilateral facet arthropathy contributing to moderate-severe spinal   stenosis, and moderate bilateral foraminal stenosis.    L4-5: Increased uncovereddisc bulge, and marked bilateral facet   arthropathy contributing to severe spinal stenosis, and severe bilateral   foraminal stenosis with flattening of the exiting L4 nerve roots.    L5-S1: Slightly decreased disc bulge osteophyte complex and moderate   bilateral facet arthropathy contributing to mild spinal stenosis, and   moderate-severe bilateral foraminal stenosis.    LIMITED EVALUATION OF UPPER SACRUM AND SACROILIAC JOINTS: Unremarkable.    IMPRESSION:    Interval progression of multilevelspondylosis and spondylolisthesis   since the prior lumbar MRI from 2016, most severe at L4-5 with   progressed grade 1 anterolisthesis resulting in severe spinal and   bilateral foraminal stenosis.    Interval progression of degenerative endplate marrow edema at L4-5 and   L1-2 (Modic type I).    Mild superior endplate height loss of L5, likely degenerative.    --- End of Report ---      PHYSICAL EXAM:  GEN: in NAD  HEENT: NCAT, b/l hearing aids in place  LUNGS: clear to ascultation b/l, no active wheezing or rhonchi  HEART: normal rate, irregular rhythm  ABD: soft, nontender, nondistended, +BS, no guarding  EXT: no edema in b/l LEs, 5/5 strength in b/l LEs and UEs  NEURO: A&Ox3

## 2023-05-16 NOTE — DISCHARGE NOTE PROVIDER - NSDCMRMEDTOKEN_GEN_ALL_CORE_FT
Eliquis 5 mg oral tablet: 1 tab(s) orally 2 times a day  finasteride 5 mg oral tablet: 1 tab(s) orally once a day  Flomax 0.4 mg oral capsule: 1 cap(s) orally once a day  furosemide 20 mg oral tablet: 1 tab(s) orally once a day  gabapentin 600 mg oral tablet: 1 orally 2 times a day  Lipitor 80 mg oral tablet: 1 tab(s) orally once a day  metoprolol succinate 100 mg oral capsule, extended release: 1 cap(s) orally once a day  Multaq 400 mg oral tablet: 1 tab(s) orally 2 times a day  Pepcid 20 mg oral tablet: 1 tab(s) orally 2 times a day  Plavix 75 mg oral tablet: 1 tab(s) orally once a day   cyclobenzaprine 5 mg oral tablet: 1 tab(s) orally every 8 hours  Eliquis 5 mg oral tablet: 1 tab(s) orally 2 times a day  finasteride 5 mg oral tablet: 1 tab(s) orally once a day  Flomax 0.4 mg oral capsule: 1 cap(s) orally once a day  furosemide 20 mg oral tablet: 1 tab(s) orally once a day  gabapentin 600 mg oral tablet: 1 orally 2 times a day  Lipitor 80 mg oral tablet: 1 tab(s) orally once a day  metoprolol succinate 100 mg oral capsule, extended release: 1 cap(s) orally once a day  Multaq 400 mg oral tablet: 1 tab(s) orally 2 times a day  Pepcid 20 mg oral tablet: 1 tab(s) orally 2 times a day  Plavix 75 mg oral tablet: 1 tab(s) orally once a day

## 2023-05-16 NOTE — DISCHARGE NOTE PROVIDER - PROVIDER TOKENS
PROVIDER:[TOKEN:[[id number]]] PROVIDER:[TOKEN:[[id number]]],PROVIDER:[TOKEN:[[id number]]]

## 2023-05-16 NOTE — DISCHARGE NOTE NURSING/CASE MANAGEMENT/SOCIAL WORK - MEDICATIONS RETURNED TO PATIENT
Student's Name:   Cesar Bowers Birth Date  2017  Sex  male  Race/Ethnicity   School/Grade Level/ID#     Address:   [address] [place] [id number]  Parent/Guardian             Telephone#                                            Home:                               Work:   IMMUNIZATIONS: To be completed by health care provider. The mo/da/yr for every dose administered is required. If a specific vaccine is medically contraindicated, a separate written statement must be attached by the health care responsible for completing the health examination explaining the medical reason for the contraindication.      Immunization History   Administered Date(s) Administered   • DTaP(INFANRIX) 08/18/2020   • DTaP/HIB/IPV 2018, 2018, 01/09/2019   • Hep A, ped/adol, 2 dose 07/11/2019, 08/18/2020   • Hep B, adolescent or pediatric 2018, 03/25/2019   • Hib (PRP-T) 10/07/2019   • Influenza, quadrivalent, preserve-free 01/09/2019, 02/20/2019, 10/07/2019   • MMR 07/11/2019   • Pneumococcal Conjugate 13 Valent Vacc (Prevnar 13) 2018, 2018, 01/09/2019, 07/11/2019   • Rotavirus - monovalent 2018, 2018   • Varicella 10/07/2019   Pended Date(s) Pended   • DTaP/IPV 10/05/2022   • Hep B, adolescent or pediatric 10/05/2022   • Measles Mumps Rubella Varicella 10/05/2022      Immunizations given 10/5/2022: MMRV, DTaP/IPV, Hepatitis B      Health care provider (MD, DO, APN, PA, school health professional, health official) verifying above immunization history must sign below. If adding dates to the above immunization history section, put your initials by date(s) and sign here.)  Signature                         10/5/22                                         Date  _____________________________________________________________________________________  Signature                                                                 Title                                                 Date  _____________________________________________________________________________________   ALTERNATIVE PROOF OF IMMUNITY   1. Clinical diagnosis (measles, mumps, hepatitis B) is allowed when verified by physician and supported with lab confirmation.  Attach copy of lab result.    * MEASLES (Rubeola)  MO   DA  YR          **MUMPS  MO   DA  YR             HEPATITIS B  MO   DA   YR           VARICELLA  MO   DA  YR      2. History of varicella (chickenpox) disease is acceptable if verified by health care provider, school health professional or health official.  Person signing below verifies that the parent/guardian’s description of varicella disease history is indicative of past infection and is accepting such history as documentation of disease.  Date of Disease                                  Signature                                                           Title   3. Laboratory Evidence of Immunity (check one)      __ Measles*         __ Mumps**        __ Rubella        __Varicella    (Attach copy of lab result)         *All measles cases diagnosed on or after July 1, 2002, must be confirmed by laboratory evidence.      **All mumps cases diagnosed on or after July 1, 2013, must be confirmed by laboratory evidence.     Completion of Alternative 1 or 3 MUST be accompanied by Labs & Physician Signature: ___________________________  Physician Statements of Immunity MUST be submitted to [facility] for review.     Certificates of Zoroastrianism Exemption to Immunizations or Physician Medical Statements of Medical Contraindication Are Reviewed   and Maintained by the School Authority     (11/2015)                                         (COMPLETE BOTH SIDES)                                  Approved [facility] 3/2016    HEALTH HISTORY      TO BE COMPLETED AND SIGNED BY PARENT/GUARDIAN AND VERIFIED BY HEALTH CARE PROVIDER  ALLERGIES: (Food, drug, insect, other) has No Known Allergies.   MEDICATION: (List all prescribed or taken on  a regular basis.) currently has no medications in their medication list.   Diagnosis of asthma?  Child wakes during night coughing? Yes    No  Yes    No  Loss of function of one of paired  organs?(eye/ear/kidney/testicle) Yes    No    Birth defects? Yes    No  Hospitalizations? Yes    No    Developmental delay? Yes    No   When? What for? Yes    No    Blood disorders? Hemophilia,  Sickle Cell, Other? Explain. Yes    No  Surgery? (List all.)  When? What for? Yes    No    Diabetes? Yes    No  Serious injury or illness? Yes    No    Head injury/Concussion/Passed out? Yes    No  TB skin test positive (past/present)? * Yes    No *If yes, refer to local [facility] dept   Seizures? What are they like?  Yes    No  TB disease (past or present)? * Yes    No *If yes, [facility]   Heart problem/Shortness of breath?  Yes    No  Tobacco use (type, frequency)?  Yes    No    Heart murmur/High blood pressure? Yes    No  Alcohol/Drug use?  Yes    No    Dizziness or chest pain with exercise? Yes    No  Family history of sudden death  before age 50? (Cause?) Yes    No    Eye/Vision problems? ______           __Glasses          __Contacts  Last exam by eye doctor ______  Other concerns? (crossed eye, drooping lids, squinting, difficulty reading) Dental?   __ Braces     __ Bridge     __ Plate   __Other   Ear/Hearing problems? Yes    No  Information may be shared with appropriate personnel for health and educational purposes.   Bone/Joint problem/injury/scoliosis? Yes    No  Parent/Guardian  Signature                                               Date   PHYSICAL EXAMINATION REQUIREMENTS   Entire section below to be completed by MD/DO/APN/PA Head Circumference if <2-3 years old - BP 97/54 (BP Location: RUE - Right upper extremity, Patient Position: Sitting, Cuff Size: Small Adult)   Pulse (!) 69   Temp 97.7 °F (36.5 °C) (Tympanic)   Resp 25   Ht 3' 3\" (0.991 m)   Wt 16.3 kg (35 lb 13.2 oz)   BMI 16.56 kg/m²   BSA 0.66 m²     79 %ile (Z= 0.81) based on CDC (Boys, 2-20 Years) BMI-for-age based on BMI available as of 10/5/2022.   DIABETES SCREENING (NOT REQUIRED FOR ) BMI>85% age/sex: No     And any two of the following: Family History: No  Ethnic Minority: No    Signs of Insulin Resistance (hypertension, dyslipidemia, polycystic ovarian syndrome, acanthosis nigricans): No  At Risk: No   LEAD RISK QUESTIONNAIRE Required for children age 6 months through 6 years enrolled in licensed or public school operated day care, , nursery school and/or . (Blood test required if resides in [place] or high risk zip [facility], [place].)  Questionnaire Administered? No  Blood Test Indicated? No   Blood Test Date/Result: <3.3 (8/18/2020).   TB SKIN OR BLOOD TEST Recommended only for children in high-risk groups including children immunosuppressed due to HIV infection or other conditions, frequent travel to or born in high prevalence countries or those exposed to adults in high-risk categories. See CDC guidelines. http://www.cdc.gov/tb/publications/factsheets/testing/TB_testing.htm.  Test Needed: No     Test performed: No                          Skin Test:    Date Read              /      /             Result:   Positive__      Negative__        mm________                          Blood Test: Date Reported       /      /               Result:  Positive__      Negative__      Value________  LAB TESTS (recommended) Date/Result  Date/Results   Hemoglobin or Hematocrit 12.3 (8/18/2020) Sickle Cell (when indicated)    Urinalysis NA Developmental Screening Tool Normal - ASQ        SYSTEM REVIEW Normal  Comments/Follow-up/Needs  Normal Comments/Follow-up/Needs   Skin  Yes  Endocrine Yes    Ears Yes                  Screening Result Gastrointestinal Yes    Eyes Yes                  Screening Result: Genito-Urinary Yes                                      LMP:    Nose Yes  Neurologic Yes    Throat Yes  Musculoskeletal Yes    Mouth/Dental Yes   Spinal Exam Yes    Cardiovascular/HTN Yes  Nutritional status Yes    Respiratory Yes Diagnosis of Asthma: No   Mental Health Yes    Currently Prescribed Asthma Medication:        No  Quick-relief medication (e.g. Short Acting Beta Antagonist)        No  Controller medication (e.g. inhaled corticosteroid) Other     NEEDS/MODIFICATIONS required in the school setting: No restrictions DIETARY Needs/Restrictions: No restrictions   SPECIAL INSTRUCTIONS/DEVICES e.g. safety glasses, glass eye, chest protector for arrhythmia, pacemaker, prosthetic device, dental bridge, false teeth, athletic support/cup: No restrictions   MENTAL HEALTH/OTHER Is there anything else the school should know about this student?  If you would like to discuss this student’s health with school or school health personnel:  Not needed   EMERGENCY ACTION needed while at school due to child’s health condition (e.g. ,seizures, asthma, insect sting, food, peanut allergy, bleeding problem, diabetes, heart problem)?   No   On the basis of the examination on this day, I approve this child’s participation in                                (If No or Modified please attach explanation.)  PHYSICAL EDUCATION:  Yes                               [employer]OR[employer]   Yes   Print Name  Michelle Emmanuel MD         Signature                                                                       Date: 10/5/2022   Address:  [facility] [address]  [facility] [address] [address] [phone]         (Complete Both Sides)     [facility]   yes

## 2023-05-17 PROBLEM — I25.10 ATHEROSCLEROTIC HEART DISEASE OF NATIVE CORONARY ARTERY WITHOUT ANGINA PECTORIS: Chronic | Status: ACTIVE | Noted: 2023-05-15

## 2023-05-20 DIAGNOSIS — I48.20 CHRONIC ATRIAL FIBRILLATION, UNSPECIFIED: ICD-10-CM

## 2023-05-20 DIAGNOSIS — Z79.02 LONG TERM (CURRENT) USE OF ANTITHROMBOTICS/ANTIPLATELETS: ICD-10-CM

## 2023-05-20 DIAGNOSIS — E66.9 OBESITY, UNSPECIFIED: ICD-10-CM

## 2023-05-20 DIAGNOSIS — I10 ESSENTIAL (PRIMARY) HYPERTENSION: ICD-10-CM

## 2023-05-20 DIAGNOSIS — Z86.711 PERSONAL HISTORY OF PULMONARY EMBOLISM: ICD-10-CM

## 2023-05-20 DIAGNOSIS — M43.17 SPONDYLOLISTHESIS, LUMBOSACRAL REGION: ICD-10-CM

## 2023-05-20 DIAGNOSIS — M54.9 DORSALGIA, UNSPECIFIED: ICD-10-CM

## 2023-05-20 DIAGNOSIS — R33.8 OTHER RETENTION OF URINE: ICD-10-CM

## 2023-05-20 DIAGNOSIS — N40.1 BENIGN PROSTATIC HYPERPLASIA WITH LOWER URINARY TRACT SYMPTOMS: ICD-10-CM

## 2023-05-20 DIAGNOSIS — E78.5 HYPERLIPIDEMIA, UNSPECIFIED: ICD-10-CM

## 2023-05-20 DIAGNOSIS — M48.02 SPINAL STENOSIS, CERVICAL REGION: ICD-10-CM

## 2023-05-20 DIAGNOSIS — Z79.01 LONG TERM (CURRENT) USE OF ANTICOAGULANTS: ICD-10-CM

## 2023-05-20 DIAGNOSIS — Z95.5 PRESENCE OF CORONARY ANGIOPLASTY IMPLANT AND GRAFT: ICD-10-CM

## 2023-05-20 DIAGNOSIS — M48.07 SPINAL STENOSIS, LUMBOSACRAL REGION: ICD-10-CM

## 2023-05-20 DIAGNOSIS — I25.10 ATHEROSCLEROTIC HEART DISEASE OF NATIVE CORONARY ARTERY WITHOUT ANGINA PECTORIS: ICD-10-CM

## 2023-05-20 DIAGNOSIS — Z86.718 PERSONAL HISTORY OF OTHER VENOUS THROMBOSIS AND EMBOLISM: ICD-10-CM

## 2023-05-20 DIAGNOSIS — M47.817 SPONDYLOSIS WITHOUT MYELOPATHY OR RADICULOPATHY, LUMBOSACRAL REGION: ICD-10-CM

## 2023-06-29 ENCOUNTER — APPOINTMENT (OUTPATIENT)
Dept: NEUROSURGERY | Facility: CLINIC | Age: 73
End: 2023-06-29
Payer: COMMERCIAL

## 2023-06-29 VITALS — BODY MASS INDEX: 37.03 KG/M2 | WEIGHT: 250 LBS | HEIGHT: 69 IN

## 2023-06-29 DIAGNOSIS — Z80.9 FAMILY HISTORY OF MALIGNANT NEOPLASM, UNSPECIFIED: ICD-10-CM

## 2023-06-29 DIAGNOSIS — Z82.61 FAMILY HISTORY OF ARTHRITIS: ICD-10-CM

## 2023-06-29 DIAGNOSIS — Z82.49 FAMILY HISTORY OF ISCHEMIC HEART DISEASE AND OTHER DISEASES OF THE CIRCULATORY SYSTEM: ICD-10-CM

## 2023-06-29 DIAGNOSIS — Z82.3 FAMILY HISTORY OF STROKE: ICD-10-CM

## 2023-06-29 DIAGNOSIS — Z87.11 PERSONAL HISTORY OF PEPTIC ULCER DISEASE: ICD-10-CM

## 2023-06-29 DIAGNOSIS — Z86.79 PERSONAL HISTORY OF OTHER DISEASES OF THE CIRCULATORY SYSTEM: ICD-10-CM

## 2023-06-29 DIAGNOSIS — Z83.79 FAMILY HISTORY OF OTHER DISEASES OF THE DIGESTIVE SYSTEM: ICD-10-CM

## 2023-06-29 PROCEDURE — 99204 OFFICE O/P NEW MOD 45 MIN: CPT

## 2023-06-30 NOTE — HISTORY OF PRESENT ILLNESS
[de-identified] : This is a 74 yo M who presents for neurosurgical consultation accompanied by his wife. He has a longstanding history of lumbar stenosis with neurogenic claudication for which he has undergone injection treatments in the past which provided relief early on but have been less effective as of late. Marked ambulatory limitations noted as he reports he cannot stand or walk for long periods of time. When shopping he has to lean on a shopping cart to continue walking.\par \par He presents at this time for imaging/testing review and to discuss surgical options. He has undergone consultation in the report past with Dr. Goodson as well as a specialist at [facility].\par \par Physical therapy completed in the past without relief. Injection treatments have also not been helpful as of late.\par \par IMAGING: MR L Spine VZ- 5/2023-  R L1-2 central and neuroforaminal stenosis. L3-4 central, right neuroforaminal narrowing. L4-5 severe central stenosis/ biforaminal stenosis.\par \par PHYSICAL EXAM: \par \par Constitutional: Well appearing, no distress\par HEENT: Normocephalic Atraumatic\par Psychiatric: Alert and oriented to all spheres, normal mood\par Pulmonary: No respiratory distress\par \par Neurologic: \par CN II-XII grossly intact\par Palpation: no pain to palpation \par Strength: 4/5 all muscle groups tested of lower extremities.\par Sensation: Full sensation to light touch in all extremities\par Reflexes: \par  2+ patellar\par  2+ ankle jerk\par \par ROM limited in all motions\par \par Signs:\par SLR negative\par \par Gait: slow/steady, walking w/ assistance.\par

## 2023-06-30 NOTE — HISTORY OF PRESENT ILLNESS
[de-identified] : This is a 74 yo M who presents for neurosurgical consultation accompanied by his wife. He has a longstanding history of lumbar stenosis with neurogenic claudication for which he has undergone injection treatments in the past which provided relief early on but have been less effective as of late. Marked ambulatory limitations noted as he reports he cannot stand or walk for long periods of time. When shopping he has to lean on a shopping cart to continue walking.\par \par He presents at this time for imaging/testing review and to discuss surgical options. He has undergone consultation in the report past with Dr. Goodson as well as a specialist at [facility].\par \par Physical therapy completed in the past without relief. Injection treatments have also not been helpful as of late.\par \par IMAGING: MR L Spine [facility]- 5/2023-  R L1-2 central and neuroforaminal stenosis. L3-4 central, right neuroforaminal narrowing. L4-5 severe central stenosis/ biforaminal stenosis.\par \par PHYSICAL EXAM: \par \par Constitutional: Well appearing, no distress\par HEENT: Normocephalic Atraumatic\par Psychiatric: Alert and oriented to all spheres, normal mood\par Pulmonary: No respiratory distress\par \par Neurologic: \par CN II-XII grossly intact\par Palpation: no pain to palpation \par Strength: 4/5 all muscle groups tested of lower extremities.\par Sensation: Full sensation to light touch in all extremities\par Reflexes: \par  2+ patellar\par  2+ ankle jerk\par \par ROM limited in all motions\par \par Signs:\par SLR negative\par \par Gait: slow/steady, walking w/ assistance.\par

## 2023-06-30 NOTE — ASSESSMENT
[FreeTextEntry1] : This is a 72 yo M who presents for neurosurgical assessment with regards to lumbar stenosis with evidence of neurogenic claudication

## 2023-08-07 NOTE — ED ADULT NURSE NOTE - EXTENSIONS OF SELF_ADULT
Problem: Fall Injury Risk  Goal: Absence of Fall and Fall-Related Injury  Outcome: Ongoing, Progressing  Intervention: Identify and Manage Contributors  Recent Flowsheet Documentation  Taken 8/6/2023 1927 by Diamond Jeff RN  Medication Review/Management: medications reviewed  Intervention: Promote Injury-Free Environment  Recent Flowsheet Documentation  Taken 8/7/2023 0400 by Diamond Jeff RN  Safety Promotion/Fall Prevention:   activity supervised   assistive device/personal items within reach   clutter free environment maintained   safety round/check completed   room organization consistent  Taken 8/7/2023 0252 by Diamond Jeff RN  Safety Promotion/Fall Prevention:   activity supervised   assistive device/personal items within reach   clutter free environment maintained   fall prevention program maintained   nonskid shoes/slippers when out of bed   room organization consistent   safety round/check completed  Taken 8/7/2023 0021 by Diamond Jeff RN  Safety Promotion/Fall Prevention:   activity supervised   clutter free environment maintained   assistive device/personal items within reach   fall prevention program maintained   room organization consistent   safety round/check completed  Taken 8/6/2023 2234 by Diamond Jeff RN  Safety Promotion/Fall Prevention:   activity supervised   clutter free environment maintained   assistive device/personal items within reach   fall prevention program maintained   room organization consistent   safety round/check completed  Taken 8/6/2023 1927 by Diamond Jeff RN  Safety Promotion/Fall Prevention:   activity supervised   assistive device/personal items within reach   clutter free environment maintained   fall prevention program maintained   safety round/check completed   room organization consistent     Problem: Asthma Comorbidity  Goal: Maintenance of Asthma Control  Outcome: Ongoing, Progressing  Intervention: Maintain Asthma Symptom  Control  Recent Flowsheet Documentation  Taken 8/6/2023 1927 by Diamond Jeff RN  Medication Review/Management: medications reviewed     Problem: COPD (Chronic Obstructive Pulmonary Disease) Comorbidity  Goal: Maintenance of COPD Symptom Control  Outcome: Ongoing, Progressing  Intervention: Maintain COPD-Symptom Control  Recent Flowsheet Documentation  Taken 8/6/2023 1927 by Diamond Jeff RN  Supportive Measures: active listening utilized  Medication Review/Management: medications reviewed     Problem: Skin Injury Risk Increased  Goal: Skin Health and Integrity  Outcome: Ongoing, Progressing  Intervention: Optimize Skin Protection  Recent Flowsheet Documentation  Taken 8/7/2023 0400 by Diamond Jeff RN  Pressure Reduction Techniques:   frequent weight shift encouraged   weight shift assistance provided  Pressure Reduction Devices: positioning supports utilized  Skin Protection:   adhesive use limited   incontinence pads utilized  Taken 8/7/2023 0252 by Diamond Jeff RN  Pressure Reduction Techniques:   frequent weight shift encouraged   weight shift assistance provided  Pressure Reduction Devices:   positioning supports utilized   pressure-redistributing mattress utilized  Skin Protection:   adhesive use limited   incontinence pads utilized  Taken 8/7/2023 0021 by Diamond Jeff RN  Pressure Reduction Techniques:   frequent weight shift encouraged   pressure points protected   weight shift assistance provided  Pressure Reduction Devices:   positioning supports utilized   pressure-redistributing mattress utilized  Skin Protection:   adhesive use limited   incontinence pads utilized  Taken 8/6/2023 2234 by Diamond Jeff RN  Pressure Reduction Techniques:   frequent weight shift encouraged   weight shift assistance provided   rest period provided between sit times  Pressure Reduction Devices:   positioning supports utilized   pressure-redistributing mattress utilized  Skin Protection:    adhesive use limited   incontinence pads utilized  Taken 8/6/2023 1927 by Diamond Jeff RN  Pressure Reduction Techniques:   frequent weight shift encouraged   weight shift assistance provided  Pressure Reduction Devices: positioning supports utilized  Skin Protection:   adhesive use limited   incontinence pads utilized     Problem: Fluid Deficit (Intestinal Obstruction)  Goal: Fluid Balance  Outcome: Ongoing, Progressing     Problem: Infection (Intestinal Obstruction)  Goal: Absence of Infection Signs and Symptoms  Outcome: Ongoing, Progressing     Problem: Nausea and Vomiting (Intestinal Obstruction)  Goal: Nausea and Vomiting Relief  Outcome: Ongoing, Progressing     Problem: Pain (Intestinal Obstruction)  Goal: Acceptable Pain Control  Outcome: Ongoing, Progressing  Intervention: Monitor and Manage Pain  Recent Flowsheet Documentation  Taken 8/6/2023 1927 by Diamond Jeff RN  Diversional Activities: television     Problem: Adult Inpatient Plan of Care  Goal: Plan of Care Review  Outcome: Ongoing, Progressing  Goal: Patient-Specific Goal (Individualized)  Outcome: Ongoing, Progressing  Goal: Absence of Hospital-Acquired Illness or Injury  Outcome: Ongoing, Progressing  Intervention: Identify and Manage Fall Risk  Recent Flowsheet Documentation  Taken 8/7/2023 0400 by Diamond Jeff RN  Safety Promotion/Fall Prevention:   activity supervised   assistive device/personal items within reach   clutter free environment maintained   safety round/check completed   room organization consistent  Taken 8/7/2023 0252 by Diamond Jeff RN  Safety Promotion/Fall Prevention:   activity supervised   assistive device/personal items within reach   clutter free environment maintained   fall prevention program maintained   nonskid shoes/slippers when out of bed   room organization consistent   safety round/check completed  Taken 8/7/2023 0021 by Diamond Jeff, RN  Safety Promotion/Fall Prevention:   activity  supervised   clutter free environment maintained   assistive device/personal items within reach   fall prevention program maintained   room organization consistent   safety round/check completed  Taken 8/6/2023 2234 by Diamond Jeff RN  Safety Promotion/Fall Prevention:   activity supervised   clutter free environment maintained   assistive device/personal items within reach   fall prevention program maintained   room organization consistent   safety round/check completed  Taken 8/6/2023 1927 by Diamond Jeff RN  Safety Promotion/Fall Prevention:   activity supervised   assistive device/personal items within reach   clutter free environment maintained   fall prevention program maintained   safety round/check completed   room organization consistent  Intervention: Prevent Skin Injury  Recent Flowsheet Documentation  Taken 8/7/2023 0400 by Diamond Jeff RN  Skin Protection:   adhesive use limited   incontinence pads utilized  Taken 8/7/2023 0252 by Diamond Jeff RN  Skin Protection:   adhesive use limited   incontinence pads utilized  Taken 8/7/2023 0021 by Diamond Jeff RN  Skin Protection:   adhesive use limited   incontinence pads utilized  Taken 8/6/2023 2234 by Diamond Jeff RN  Skin Protection:   adhesive use limited   incontinence pads utilized  Taken 8/6/2023 1927 by Diamond Jeff RN  Skin Protection:   adhesive use limited   incontinence pads utilized  Intervention: Prevent and Manage VTE (Venous Thromboembolism) Risk  Recent Flowsheet Documentation  Taken 8/7/2023 0400 by Diamond Jeff RN  Activity Management: activity encouraged  Taken 8/7/2023 0252 by Diamond Jeff RN  Activity Management: activity encouraged  Taken 8/7/2023 0021 by Diamond Jeff RN  Activity Management: activity encouraged  Taken 8/6/2023 2234 by Diamond Jeff RN  Activity Management: activity encouraged  Taken 8/6/2023 1927 by Diamond Jeff RN  Activity Management: activity  encouraged  VTE Prevention/Management: patient refused intervention  Intervention: Prevent Infection  Recent Flowsheet Documentation  Taken 8/7/2023 0400 by Diamond Jeff RN  Infection Prevention: rest/sleep promoted  Taken 8/7/2023 0252 by Diamond Jeff RN  Infection Prevention: rest/sleep promoted  Taken 8/7/2023 0021 by Diamond Jeff RN  Infection Prevention: rest/sleep promoted  Taken 8/6/2023 2234 by Diamond Jeff RN  Infection Prevention:   single patient room provided   rest/sleep promoted  Taken 8/6/2023 1927 by Diamond Jeff RN  Infection Prevention:   single patient room provided   rest/sleep promoted  Goal: Optimal Comfort and Wellbeing  Outcome: Ongoing, Progressing  Intervention: Provide Person-Centered Care  Recent Flowsheet Documentation  Taken 8/6/2023 1927 by Diamond Jeff RN  Trust Relationship/Rapport:   care explained   thoughts/feelings acknowledged   questions answered   questions encouraged  Goal: Readiness for Transition of Care  Outcome: Ongoing, Progressing     Problem: Adjustment to Illness (Sepsis/Septic Shock)  Goal: Optimal Coping  Outcome: Ongoing, Progressing  Intervention: Optimize Psychosocial Adjustment to Illness  Recent Flowsheet Documentation  Taken 8/6/2023 1927 by Diamond Jeff RN  Supportive Measures: active listening utilized     Problem: Bleeding (Sepsis/Septic Shock)  Goal: Absence of Bleeding  Outcome: Ongoing, Progressing     Problem: Glycemic Control Impaired (Sepsis/Septic Shock)  Goal: Blood Glucose Level Within Desired Range  Outcome: Ongoing, Progressing     Problem: Infection Progression (Sepsis/Septic Shock)  Goal: Absence of Infection Signs and Symptoms  Outcome: Ongoing, Progressing  Intervention: Initiate Sepsis Management  Recent Flowsheet Documentation  Taken 8/7/2023 0400 by Diamond Jeff RN  Infection Prevention: rest/sleep promoted  Taken 8/7/2023 0252 by Diamond Jeff RN  Infection Prevention: rest/sleep  promoted  Taken 8/7/2023 0021 by Diamond Jeff RN  Infection Prevention: rest/sleep promoted  Taken 8/6/2023 2234 by Diamond Jeff RN  Infection Prevention:   single patient room provided   rest/sleep promoted  Taken 8/6/2023 1927 by Diamond Jeff RN  Infection Prevention:   single patient room provided   rest/sleep promoted  Intervention: Promote Recovery  Recent Flowsheet Documentation  Taken 8/7/2023 0400 by Diamond Jeff RN  Activity Management: activity encouraged  Taken 8/7/2023 0252 by Diamond Jeff RN  Activity Management: activity encouraged  Taken 8/7/2023 0021 by Diamond Jeff, RN  Activity Management: activity encouraged  Taken 8/6/2023 2234 by Diamond Jeff RN  Activity Management: activity encouraged  Taken 8/6/2023 1927 by Diamond Jeff RN  Activity Management: activity encouraged     Problem: Nutrition Impaired (Sepsis/Septic Shock)  Goal: Optimal Nutrition Intake  Outcome: Ongoing, Progressing   Goal Outcome Evaluation:                         None

## 2023-10-16 ENCOUNTER — APPOINTMENT (OUTPATIENT)
Dept: NEUROSURGERY | Facility: CLINIC | Age: 73
End: 2023-10-16

## 2023-10-26 ENCOUNTER — APPOINTMENT (OUTPATIENT)
Dept: NEUROSURGERY | Facility: CLINIC | Age: 73
End: 2023-10-26

## 2024-01-02 ENCOUNTER — NON-APPOINTMENT (OUTPATIENT)
Age: 74
End: 2024-01-02

## 2024-01-29 ENCOUNTER — APPOINTMENT (OUTPATIENT)
Dept: NEUROSURGERY | Facility: CLINIC | Age: 74
End: 2024-01-29
Payer: COMMERCIAL

## 2024-01-29 VITALS — WEIGHT: 250 LBS | BODY MASS INDEX: 37.03 KG/M2 | HEIGHT: 69 IN

## 2024-01-29 DIAGNOSIS — M48.062 SPINAL STENOSIS, LUMBAR REGION WITH NEUROGENIC CLAUDICATION: ICD-10-CM

## 2024-01-29 PROCEDURE — 99211 OFF/OP EST MAY X REQ PHY/QHP: CPT

## 2024-01-30 PROBLEM — M48.062 SPINAL STENOSIS OF LUMBAR REGION WITH NEUROGENIC CLAUDICATION: Status: ACTIVE | Noted: 2023-06-29

## 2024-01-30 NOTE — REASON FOR VISIT
[Follow-Up: _____] : a [unfilled] follow-up visit [FreeTextEntry1] : continues to have claudication  poor surgical candidate, nor does he want surgery recommend PT return as needed

## 2024-02-19 ENCOUNTER — OUTPATIENT (OUTPATIENT)
Dept: OUTPATIENT SERVICES | Facility: HOSPITAL | Age: 74
LOS: 1 days | End: 2024-02-19
Payer: COMMERCIAL

## 2024-02-19 DIAGNOSIS — M48.062 SPINAL STENOSIS, LUMBAR REGION WITH NEUROGENIC CLAUDICATION: ICD-10-CM

## 2024-02-19 DIAGNOSIS — Z95.5 PRESENCE OF CORONARY ANGIOPLASTY IMPLANT AND GRAFT: Chronic | ICD-10-CM

## 2024-02-19 DIAGNOSIS — Z98.890 OTHER SPECIFIED POSTPROCEDURAL STATES: Chronic | ICD-10-CM

## 2024-02-19 PROCEDURE — 97162 PT EVAL MOD COMPLEX 30 MIN: CPT | Mod: GP

## 2024-02-20 DIAGNOSIS — M48.062 SPINAL STENOSIS, LUMBAR REGION WITH NEUROGENIC CLAUDICATION: ICD-10-CM

## 2024-03-11 ENCOUNTER — OUTPATIENT (OUTPATIENT)
Dept: OUTPATIENT SERVICES | Facility: HOSPITAL | Age: 74
LOS: 1 days | End: 2024-03-11
Payer: COMMERCIAL

## 2024-03-11 DIAGNOSIS — M48.062 SPINAL STENOSIS, LUMBAR REGION WITH NEUROGENIC CLAUDICATION: ICD-10-CM

## 2024-03-11 DIAGNOSIS — Z98.890 OTHER SPECIFIED POSTPROCEDURAL STATES: Chronic | ICD-10-CM

## 2024-03-11 DIAGNOSIS — Z95.5 PRESENCE OF CORONARY ANGIOPLASTY IMPLANT AND GRAFT: Chronic | ICD-10-CM

## 2024-03-11 PROCEDURE — 97010 HOT OR COLD PACKS THERAPY: CPT | Mod: GP

## 2024-03-11 PROCEDURE — 97110 THERAPEUTIC EXERCISES: CPT | Mod: GP

## 2024-03-12 DIAGNOSIS — M48.062 SPINAL STENOSIS, LUMBAR REGION WITH NEUROGENIC CLAUDICATION: ICD-10-CM

## 2024-03-18 ENCOUNTER — OUTPATIENT (OUTPATIENT)
Dept: OUTPATIENT SERVICES | Facility: HOSPITAL | Age: 74
LOS: 1 days | End: 2024-03-18

## 2024-03-18 DIAGNOSIS — Z95.5 PRESENCE OF CORONARY ANGIOPLASTY IMPLANT AND GRAFT: Chronic | ICD-10-CM

## 2024-03-18 DIAGNOSIS — M48.062 SPINAL STENOSIS, LUMBAR REGION WITH NEUROGENIC CLAUDICATION: ICD-10-CM

## 2024-03-18 DIAGNOSIS — Z98.890 OTHER SPECIFIED POSTPROCEDURAL STATES: Chronic | ICD-10-CM

## 2024-03-25 ENCOUNTER — OUTPATIENT (OUTPATIENT)
Dept: OUTPATIENT SERVICES | Facility: HOSPITAL | Age: 74
LOS: 1 days | End: 2024-03-25

## 2024-03-25 DIAGNOSIS — M48.062 SPINAL STENOSIS, LUMBAR REGION WITH NEUROGENIC CLAUDICATION: ICD-10-CM

## 2024-03-25 DIAGNOSIS — Z95.5 PRESENCE OF CORONARY ANGIOPLASTY IMPLANT AND GRAFT: Chronic | ICD-10-CM

## 2024-03-25 DIAGNOSIS — Z98.890 OTHER SPECIFIED POSTPROCEDURAL STATES: Chronic | ICD-10-CM

## 2024-04-01 ENCOUNTER — OUTPATIENT (OUTPATIENT)
Dept: OUTPATIENT SERVICES | Facility: HOSPITAL | Age: 74
LOS: 1 days | End: 2024-04-01

## 2024-04-01 DIAGNOSIS — Z98.890 OTHER SPECIFIED POSTPROCEDURAL STATES: Chronic | ICD-10-CM

## 2024-04-01 DIAGNOSIS — M48.062 SPINAL STENOSIS, LUMBAR REGION WITH NEUROGENIC CLAUDICATION: ICD-10-CM

## 2024-04-01 DIAGNOSIS — Z95.5 PRESENCE OF CORONARY ANGIOPLASTY IMPLANT AND GRAFT: Chronic | ICD-10-CM

## 2024-04-01 PROCEDURE — 97110 THERAPEUTIC EXERCISES: CPT | Mod: GP

## 2024-04-01 PROCEDURE — 97010 HOT OR COLD PACKS THERAPY: CPT | Mod: GP

## 2024-04-02 DIAGNOSIS — M48.062 SPINAL STENOSIS, LUMBAR REGION WITH NEUROGENIC CLAUDICATION: ICD-10-CM

## 2024-04-04 NOTE — ED ADULT NURSE NOTE - NS ED NURSE LEVEL OF CONSCIOUSNESS AFFECT
I called patients wife to inform her that the CT Abd/Pelvis Scan is scheduled on Wednesday, 4/10/24 @ 2pm in [place].  He has to arrive @ 1pm and fast for 4hrs prior.  He'll f/u with Dr. Love after @ 3pm on 4/10/24.  Marquez  
Calm

## 2024-04-15 ENCOUNTER — OUTPATIENT (OUTPATIENT)
Dept: OUTPATIENT SERVICES | Facility: HOSPITAL | Age: 74
LOS: 1 days | End: 2024-04-15

## 2024-04-15 DIAGNOSIS — Z95.5 PRESENCE OF CORONARY ANGIOPLASTY IMPLANT AND GRAFT: Chronic | ICD-10-CM

## 2024-04-15 DIAGNOSIS — M48.062 SPINAL STENOSIS, LUMBAR REGION WITH NEUROGENIC CLAUDICATION: ICD-10-CM

## 2024-04-15 DIAGNOSIS — Z98.890 OTHER SPECIFIED POSTPROCEDURAL STATES: Chronic | ICD-10-CM

## 2024-04-24 NOTE — H&P PST ADULT - CARDIOVASCULAR
Subjective   Patient ID: Zach Zendejas is a 75 y.o. male who presents for Hypertension (Follow up).    HPI     Follow-up of  hypertension. Current medications ACE Inhibitor, CCB, and Thiazide.  Home blood pressure readings:  100s-110s/60s . Use of agents associated with hypertension: none. Medication compliance: taking as prescribed.     He does see nephro for his BP. He saw him recently and he was happy with everything. He added amlodipine and hydrochlorothiazide in addition to his lisinopril. He also recommended moving his statin to evening. He takes amlodipine 5 mg bid and lisinopril 10 mg bid.     After his covid booster about 3 weeks ago it knocked him out and ended up having back back pain and he had to cut down on hiking. He is back to it. He tooks nsaids which didn't bother him and it seemed to help.  Last week, his BP was around 100 systolic. He was more fatigued and dizzy. This past Monday, his BP was 132/74, he had a more salty dinner the night before. Later on Monday it came down and the next morning it was down to 107 systolic. He feels fine with systolic over 105.     Review of Systems   Constitutional:  Negative for chills, fatigue and fever.   Respiratory:  Negative for cough, shortness of breath and wheezing.    Cardiovascular:  Negative for chest pain, palpitations and leg swelling.   Gastrointestinal:  Negative for abdominal pain, constipation, diarrhea and nausea.   Genitourinary:  Negative for dysuria, frequency, hematuria and urgency.   Neurological:  Negative for dizziness, numbness and headaches.   Psychiatric/Behavioral:  Negative for dysphoric mood. The patient is not nervous/anxious.        Objective   /70 (BP Location: Left arm, Patient Position: Sitting, BP Cuff Size: Adult)   Pulse (!) 47   Temp 36.3 °C (97.4 °F) (Temporal)   Wt 79.6 kg (175 lb 6.4 oz)   SpO2 97%   BMI 26.67 kg/m²     Physical Exam  Constitutional:       Appearance: Normal appearance.   HENT:      Head:  Normocephalic and atraumatic.   Eyes:      Extraocular Movements: Extraocular movements intact.      Pupils: Pupils are equal, round, and reactive to light.   Cardiovascular:      Rate and Rhythm: Normal rate and regular rhythm.      Heart sounds: Normal heart sounds. No murmur heard.  Pulmonary:      Effort: Pulmonary effort is normal.      Breath sounds: Normal breath sounds. No wheezing.   Abdominal:      General: Bowel sounds are normal.      Palpations: Abdomen is soft.      Tenderness: There is no abdominal tenderness. There is no guarding.   Musculoskeletal:         General: Normal range of motion.   Skin:     General: Skin is warm and dry.   Neurological:      General: No focal deficit present.      Mental Status: He is alert and oriented to person, place, and time.   Psychiatric:         Mood and Affect: Mood normal.         Behavior: Behavior normal.         Assessment/Plan   Problem List Items Addressed This Visit       Hypertension - Primary    Relevant Orders    Lipid Panel    Comprehensive Metabolic Panel    CBC     He is doing well on his current medications.  Will continue.  He thinks that his nephrologist is going to continue to prescribe those.  His blood pressure is lower but he is tolerating that fine and his nephrologist said as long as he feels okay that his blood pressure is fine       Patient understands and agrees with treatment plan    Sarah Cheatham, DO   04/24/24    normal/regular rate and rhythm/S1 S2 present/no gallops/no rub/no murmur negative

## 2024-05-15 NOTE — H&P ADULT - DOES THIS PATIENT HAVE A HISTORY OF OR HAS BEEN DX WITH HEART FAILURE?
Detail Level: Generalized Detail Level: Zone Moisturizer Recommendations: OTC Amlactin lotion/cream at least once daily. Detail Level: Simple Detail Level: Detailed no Prescription Strength Graduated Compression Stockings Recommendations: The patient was counseled that prescription strength graduated compression stockings should be worn for all waking hours. They will follow up with a venous specialist to monitor graduated compression stocking usage and their symptoms.

## 2024-12-15 PROBLEM — S90.31XA CONTUSION OF FOOT, RIGHT: Status: ACTIVE | Noted: 2024-12-15

## 2024-12-29 ENCOUNTER — EMERGENCY (EMERGENCY)
Facility: HOSPITAL | Age: 74
LOS: 0 days | Discharge: HOME | End: 2024-12-29
Attending: STUDENT IN AN ORGANIZED HEALTH CARE EDUCATION/TRAINING PROGRAM
Payer: MEDICARE

## 2024-12-29 VITALS
RESPIRATION RATE: 19 BRPM | TEMPERATURE: 98 F | DIASTOLIC BLOOD PRESSURE: 77 MMHG | HEART RATE: 66 BPM | SYSTOLIC BLOOD PRESSURE: 124 MMHG | OXYGEN SATURATION: 97 %

## 2024-12-29 DIAGNOSIS — Z95.5 PRESENCE OF CORONARY ANGIOPLASTY IMPLANT AND GRAFT: Chronic | ICD-10-CM

## 2024-12-29 DIAGNOSIS — E78.5 HYPERLIPIDEMIA, UNSPECIFIED: ICD-10-CM

## 2024-12-29 DIAGNOSIS — Z88.8 ALLERGY STATUS TO OTHER DRUGS, MEDICAMENTS AND BIOLOGICAL SUBSTANCES: ICD-10-CM
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
## 2024-12-29 DIAGNOSIS — Z86.711 PERSONAL HISTORY OF PULMONARY EMBOLISM: ICD-10-CM

## 2024-12-29 DIAGNOSIS — Z98.890 OTHER SPECIFIED POSTPROCEDURAL STATES: Chronic | ICD-10-CM

## 2024-12-29 DIAGNOSIS — Z98.61 CORONARY ANGIOPLASTY STATUS: ICD-10-CM
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
## 2024-12-29 DIAGNOSIS — I10 ESSENTIAL (PRIMARY) HYPERTENSION: ICD-10-CM

## 2024-12-29 DIAGNOSIS — Z86.718 PERSONAL HISTORY OF OTHER VENOUS THROMBOSIS AND EMBOLISM: ICD-10-CM

## 2024-12-29 DIAGNOSIS — S42.401A UNSPECIFIED FRACTURE OF LOWER END OF RIGHT HUMERUS, INITIAL ENCOUNTER FOR CLOSED FRACTURE: ICD-10-CM

## 2024-12-29 DIAGNOSIS — Z79.02 LONG TERM (CURRENT) USE OF ANTITHROMBOTICS/ANTIPLATELETS: ICD-10-CM

## 2024-12-29 DIAGNOSIS — M48.00 SPINAL STENOSIS, SITE UNSPECIFIED: ICD-10-CM

## 2024-12-29 DIAGNOSIS — M25.511 PAIN IN RIGHT SHOULDER: ICD-10-CM

## 2024-12-29 DIAGNOSIS — I25.10 ATHEROSCLEROTIC HEART DISEASE OF NATIVE CORONARY ARTERY WITHOUT ANGINA PECTORIS: ICD-10-CM

## 2024-12-29 PROCEDURE — 73090 X-RAY EXAM OF FOREARM: CPT | Mod: 26,RT

## 2024-12-29 PROCEDURE — 99284 EMERGENCY DEPT VISIT MOD MDM: CPT | Mod: 25

## 2024-12-29 PROCEDURE — 73030 X-RAY EXAM OF SHOULDER: CPT | Mod: RT

## 2024-12-29 PROCEDURE — 29125 APPL SHORT ARM SPLINT STATIC: CPT | Mod: RT

## 2024-12-29 PROCEDURE — 24576 CLTX HUMRL CNDYLR FX WO MNPJ: CPT | Mod: 54,RT

## 2024-12-29 PROCEDURE — 73030 X-RAY EXAM OF SHOULDER: CPT | Mod: 26,RT

## 2024-12-29 PROCEDURE — 73080 X-RAY EXAM OF ELBOW: CPT | Mod: 26,RT

## 2024-12-29 PROCEDURE — 99284 EMERGENCY DEPT VISIT MOD MDM: CPT | Mod: 57

## 2024-12-29 PROCEDURE — 73060 X-RAY EXAM OF HUMERUS: CPT | Mod: RT

## 2024-12-29 PROCEDURE — 73060 X-RAY EXAM OF HUMERUS: CPT | Mod: 26,RT

## 2024-12-29 PROCEDURE — 73090 X-RAY EXAM OF FOREARM: CPT | Mod: RT

## 2024-12-29 PROCEDURE — 73080 X-RAY EXAM OF ELBOW: CPT | Mod: RT

## 2024-12-29 RX ORDER — ACETAMINOPHEN 500MG 500 MG/1
975 TABLET, COATED ORAL ONCE
Refills: 0 | Status: COMPLETED | OUTPATIENT
Start: 2024-12-29 | End: 2024-12-29

## 2024-12-29 RX ADMIN — ACETAMINOPHEN 500MG 975 MILLIGRAM(S): 500 TABLET, COATED ORAL at 17:44

## 2024-12-29 NOTE — ED ADULT NURSE NOTE - OBJECTIVE STATEMENT
BIBEMS s/p fall down 3 steps on R side. -LOC, -HT, +AC, on Eliquis and Plavix. Pt complaining of R wrist and R arm pain.

## 2024-12-29 NOTE — ED PROVIDER NOTE - CLINICAL SUMMARY MEDICAL DECISION MAKING FREE TEXT BOX
Pt here with RUE pain after mechanical fall. Neurovascularly intact. Full ROM distally but proximal ROM limited 2/2 pain. Plan for imaging r/o fx, dislocation, contusion, effusion. Pain control and reassess. Xray with elbow fx s/p posterior long arm splint and sling. Given ortho f/u. Discussed splint care with pt and wife over phone. Strict ED return precautions given. Pt verbalized understanding and was agreeable with plan.

## 2024-12-29 NOTE — ED ADULT NURSE NOTE - NSFALLHARMRISKINTERV_ED_ALL_ED

## 2024-12-29 NOTE — ED PROVIDER NOTE - PHYSICAL EXAMINATION
CONSTITUTIONAL: well developed, well nourished, no acute distress, ABC intact, GCS 15  HEAD: normocephalic, atraumatic  EYES:  no raccoon eyes  ENT: no flores sign, moist mucous membranes  NECK: no midline tenderness, no stepoffs, no deformity  CV: regular rate, regular rhythm, equal distal pulses  RESP: lungs clear to auscultation bilaterally, normal work of breathing, symmetric rise and fall of chest  CHEST: no chest wall tenderness, no crepitus, no clavicular deformity/tenting  ABD: soft, nondistended, no tenderness, no rebound, no guarding, no rigidity, no pelvic instability  BACK: no midline T/L/S-spine tenderness, no stepoffs, no deformity  EXT: tenderness to R upper shoulder/arm/elbow, normal OK sign, normal finger adduction/abduction, normal wrist flexion/extension, normal  strength, LUE held in flexion/internal rotation, limited L forearm supination 2/2 pain, no tenderness to wrist/forearm, 2+ radial pulses, sensation intact to touch, cap refill <2 seconds, soft compartments  SKIN: no rashes, no lacerations, no abrasions  NEURO: A&Ox3, sensation grossly intact throughout, no focal neurological deficits, GCS 15  PSYCH: normal mood, appropriate affect

## 2024-12-29 NOTE — ED PROVIDER NOTE - OBJECTIVE STATEMENT
75 yo M with hx of BPH, CAD s/p PCI on plavix, DVT/PE on eliquis, HTN, HLD, spinal stenosis who was BIBEMS after mechanical fall that occurred prior to arrival. Pt tripped on a balloon on the ground and fell down, hitting his R arm on the ground. No head trauma or LOC. No preceding cp, sob, dizziness prior to fall. Ambulatory after. No pain elsewhere. No weakness, numbness. R hand dominant.    PMD Dr. Teixeira

## 2024-12-29 NOTE — ED PROVIDER NOTE - DIFFERENTIAL DIAGNOSIS
Differential Diagnosis differential dx includes but is not limited to:  fx, dislocation, contusion, effusion

## 2024-12-29 NOTE — ED PROVIDER NOTE - NSFOLLOWUPINSTRUCTIONS_ED_ALL_ED_FT
Our Emergency Department Referral Coordinators will be reaching out to you in the next 24-48 hours from 9:00am to 5:00pm to schedule a follow up appointment. Please expect a phone call from the hospital in that time frame. If you do not receive a call or if you have any questions or concerns, you can reach them at [phone].  ---  Fracture    A fracture is a break in one of your bones. This can occur from a variety of injuries, especially traumatic ones. Symptoms include pain, bruising, or swelling. Do not use the injured limb. If a fracture is in one of the bones below your waist, do not put weight on that limb unless instructed to do so by your healthcare provider. Crutches or a cane may have been provided. A splint or cast may have been applied by your health care provider. Make sure to keep it dry and follow up with an orthopedist as instructed.    SEEK IMMEDIATE MEDICAL CARE IF YOU HAVE ANY OF THE FOLLOWING SYMPTOMS: numbness, tingling, increasing pain, or weakness in any part of the injured limb.    Splint Care    WHAT YOU NEED TO KNOW:    Splint care is important to help protect your splint until it comes off. Some splints are made of fiberglass or plaster that will need to dry and harden. Splint care will help the splint dry and harden correctly. Even after your splint hardens, it can be damaged.    DISCHARGE INSTRUCTIONS:    Return to the emergency department if:   - You have increased pain.  - Your fingers or toes are numb or tingling.  - You feel burning or stinging around your injury.  - Your nails, fingers, or toes turn pale, blue, or gray, and feel cold.  - You have new or increased trouble moving your fingers or toes.  - Your swelling gets worse.  - The skin under your splint is bleeding or leaking pus.     Contact your healthcare provider if:   - Your hard splint gets wet or is damaged.  - You have a fever.  - Your splint feels tighter.  - You have itchy, dry skin under your splint that is getting worse.  - The skin under your splint is red, or you have a new sore.  - You notice a bad smell coming from your splint.   - You have questions or concerns about your condition or care.    How to care for your splint:     Wait for your hard splint to harden completely. You may have to wait up to 3 days before you can walk on a plaster splint.    Check your splint and the skin around it each day. Check your splint for damage, such as cracks and breaks. Check your skin for redness, increased swelling, and sores. Loosen the elastic bandage around your splint if it feels too tight.    Keep your splint clean and dry. Keep dirt out of your splint. Before you bathe, wrap your hard splint with 2 layers of plastic. Then put a plastic bag over it. Keep the plastic bag tightly sealed. You can also ask your healthcare provider about waterproof shields. Do not put your hard splint in the water, even with a plastic bag over it. A wet splint can make your skin itchy, and may lead to infection.    Do not put powders or deodorants inside your splint. These can dry your skin and increase itching.     Do not try to scratch the skin inside your hard splint with sharp objects. Sharp objects can break off inside your splint or hurt your skin.     Do not pull the padding out of your splint. The padding inside your splint protects your skin. You may develop a sore on your skin if you take out the padding.    Follow up with your healthcare provider as directed within 1 to 2 weeks: Write down your questions so you remember to ask them during your visits.

## 2024-12-29 NOTE — ED PROVIDER NOTE - PATIENT PORTAL LINK FT
You can access the FollowMyHealth Patient Portal offered by [facility] by registering at the following website: [email or web address]. By joining [facility]’s FollowMyHealth portal, you will also be able to view your health information using other applications (apps) compatible with our system.

## 2024-12-29 NOTE — ED ADULT TRIAGE NOTE - BP NONINVASIVE SYSTOLIC (MM HG)
"Anesthesia Transfer of Care Note    Patient: Boom Blanco    Procedure(s) Performed: Procedure(s) (LRB):  ARTHROPLASTY, KNEE, TOTAL (Left)    Patient location: PACU    Anesthesia Type: general    Transport from OR: Transported from OR on room air with adequate spontaneous ventilation    Post pain: adequate analgesia    Post assessment: no apparent anesthetic complications and tolerated procedure well    Post vital signs: stable    Level of consciousness: sedated and responds to stimulation    Nausea/Vomiting: no nausea/vomiting    Complications: none    Transfer of care protocol was followed      Last vitals:   Visit Vitals  /67   Pulse 78   Temp 37 °C (98.6 °F) (Oral)   Resp (!) 23   Ht 5' 9" (1.753 m)   Wt 86.2 kg (190 lb)   LMP  (LMP Unknown)   SpO2 99%   Breastfeeding? No   BMI 28.06 kg/m²     " 124

## 2024-12-29 NOTE — ED PROVIDER NOTE - NSICDXPASTMEDICALHX_GEN_ALL_CORE_FT
PAST MEDICAL HISTORY:  Atrial fibrillation     BPH (benign prostatic hyperplasia)     CAD (coronary artery disease)     Deep vein thrombosis (DVT)     Hyperlipidemia     Hypertension     Pulmonary embolus     Spinal stenosis

## 2024-12-29 NOTE — ED PROVIDER NOTE - PROGRESS NOTE DETAILS
TC: Pt here with RUE pain after mechanical fall. Neurovascularly intact. Full ROM distally but proximal ROM limited 2/2 pain. Plan for imaging r/o fx, dislocation, contusion, effusion. Pain control and reassess. TC: Xray with elbow fx s/p posterior long arm splint and sling. Given ortho f/u. Strict ED return precautions given. Pt verbalized understanding and was agreeable with plan.

## 2025-01-02 ENCOUNTER — APPOINTMENT (OUTPATIENT)
Dept: ORTHOPEDIC SURGERY | Facility: CLINIC | Age: 75
End: 2025-01-02
Payer: COMMERCIAL

## 2025-01-02 ENCOUNTER — NON-APPOINTMENT (OUTPATIENT)
Age: 75
End: 2025-01-02

## 2025-01-02 DIAGNOSIS — S56.911A STRAIN OF UNSPECIFIED MUSCLES, FASCIA AND TENDONS AT FOREARM LEVEL, RIGHT ARM, INITIAL ENCOUNTER: ICD-10-CM

## 2025-01-02 DIAGNOSIS — S46.911A STRAIN OF UNSPECIFIED MUSCLE, FASCIA AND TENDON AT SHOULDER AND UPPER ARM LEVEL, RIGHT ARM, INITIAL ENCOUNTER: ICD-10-CM

## 2025-01-02 PROBLEM — I48.91 UNSPECIFIED ATRIAL FIBRILLATION: Chronic | Status: ACTIVE | Noted: 2024-12-29

## 2025-01-02 PROBLEM — I82.409 ACUTE EMBOLISM AND THROMBOSIS OF UNSPECIFIED DEEP VEINS OF UNSPECIFIED LOWER EXTREMITY: Chronic | Status: ACTIVE | Noted: 2024-12-29

## 2025-01-02 PROCEDURE — 99203 OFFICE O/P NEW LOW 30 MIN: CPT

## 2025-01-02 PROCEDURE — 73080 X-RAY EXAM OF ELBOW: CPT | Mod: RT

## 2025-01-06 ENCOUNTER — APPOINTMENT (OUTPATIENT)
Facility: CLINIC | Age: 75
End: 2025-01-06

## 2025-01-09 ENCOUNTER — NON-APPOINTMENT (OUTPATIENT)
Age: 75
End: 2025-01-09

## 2025-01-16 ENCOUNTER — RESULT REVIEW (OUTPATIENT)
Age: 75
End: 2025-01-16

## 2025-01-16 ENCOUNTER — OUTPATIENT (OUTPATIENT)
Dept: OUTPATIENT SERVICES | Facility: HOSPITAL | Age: 75
LOS: 1 days | End: 2025-01-16
Payer: COMMERCIAL

## 2025-01-16 DIAGNOSIS — Z98.890 OTHER SPECIFIED POSTPROCEDURAL STATES: Chronic | ICD-10-CM

## 2025-01-16 DIAGNOSIS — Z95.5 PRESENCE OF CORONARY ANGIOPLASTY IMPLANT AND GRAFT: Chronic | ICD-10-CM

## 2025-01-16 DIAGNOSIS — M25.511 PAIN IN RIGHT SHOULDER: ICD-10-CM

## 2025-01-16 DIAGNOSIS — Z00.8 ENCOUNTER FOR OTHER GENERAL EXAMINATION: ICD-10-CM

## 2025-01-16 PROCEDURE — 73221 MRI JOINT UPR EXTREM W/O DYE: CPT | Mod: RT

## 2025-01-16 PROCEDURE — 73221 MRI JOINT UPR EXTREM W/O DYE: CPT | Mod: 26,RT

## 2025-01-17 ENCOUNTER — NON-APPOINTMENT (OUTPATIENT)
Age: 75
End: 2025-01-17

## 2025-01-17 DIAGNOSIS — M25.511 PAIN IN RIGHT SHOULDER: ICD-10-CM

## 2025-01-21 ENCOUNTER — APPOINTMENT (OUTPATIENT)
Dept: ORTHOPEDIC SURGERY | Facility: CLINIC | Age: 75
End: 2025-01-21
Payer: COMMERCIAL

## 2025-01-21 DIAGNOSIS — S46.911A STRAIN OF UNSPECIFIED MUSCLE, FASCIA AND TENDON AT SHOULDER AND UPPER ARM LEVEL, RIGHT ARM, INITIAL ENCOUNTER: ICD-10-CM

## 2025-01-21 DIAGNOSIS — S46.011D STRAIN OF MUSCLE(S) AND TENDON(S) OF THE ROTATOR CUFF OF RIGHT SHOULDER, SUBSEQUENT ENCOUNTER: ICD-10-CM

## 2025-01-21 PROCEDURE — 99213 OFFICE O/P EST LOW 20 MIN: CPT

## 2025-01-30 NOTE — ED PROVIDER NOTE - WR ORDER ID 3
[Menstruating] : The patient is menstruating [Definite ___ (Date)] : the last menstrual period was [unfilled] [Regular Cycle Intervals] : have been regular [Live Births ___] : P[unfilled]  [Age At Live Birth ___] : Age at live birth: [unfilled] [History of Hormone Replacement Treatment] : has no history of hormone replacement treatment [id number]

## 2025-02-06 ENCOUNTER — APPOINTMENT (OUTPATIENT)
Dept: ORTHOPEDIC SURGERY | Facility: CLINIC | Age: 75
End: 2025-02-06
Payer: COMMERCIAL

## 2025-02-06 ENCOUNTER — APPOINTMENT (OUTPATIENT)
Dept: ORTHOPEDIC SURGERY | Facility: CLINIC | Age: 75
End: 2025-02-06

## 2025-02-06 DIAGNOSIS — S46.011D STRAIN OF MUSCLE(S) AND TENDON(S) OF THE ROTATOR CUFF OF RIGHT SHOULDER, SUBSEQUENT ENCOUNTER: ICD-10-CM

## 2025-02-06 PROCEDURE — 99203 OFFICE O/P NEW LOW 30 MIN: CPT

## 2025-02-06 PROCEDURE — 99213 OFFICE O/P EST LOW 20 MIN: CPT

## 2025-04-24 ENCOUNTER — APPOINTMENT (OUTPATIENT)
Dept: ORTHOPEDIC SURGERY | Facility: CLINIC | Age: 75
End: 2025-04-24
Payer: COMMERCIAL

## 2025-04-24 DIAGNOSIS — S46.011D STRAIN OF MUSCLE(S) AND TENDON(S) OF THE ROTATOR CUFF OF RIGHT SHOULDER, SUBSEQUENT ENCOUNTER: ICD-10-CM

## 2025-04-24 PROCEDURE — 99213 OFFICE O/P EST LOW 20 MIN: CPT

## 2025-05-14 NOTE — PATIENT PROFILE ADULT - PRIMARY SOURCE OF SUPPORT/COMFORT
Interventional Pain Management - Established Visit  Follow-Up     Referring Physician: No ref. provider found    Chief Complaint:   Chief Complaint   Patient presents with    Low-back Pain          SUBJECTIVE: Disclaimer: This note has been generated using voice-recognition software. There may be typographical errors that have been missed during proof-reading    Interval History 5/14/2025:  Renny Young returns for follow-up after Left L5/S1 and S1 TFESI on 4/1/2025. He reports 80% relief of left leg pain. He continues with axial lower back pain left>right. His back pain is worse with walking and standing. The pain is aching in the morning. He has had bilateral L3, L4, L5 RFA in the past with 80% relief for over 12 months. He continues a physical therapy-directed home exercise program 2-3 times per week. He takes gabapentin 300 mg BID with benefit. He denies any perceived side effects. He denies recent health changes. He denies recent falls or trauma. He denies new onset fever/night sweats, urinary incontinence, bowel incontinence, significant weight changes, significant motor weakness or changes, or loss of sensations. His back pain today is 5/10.      Interval History 3/18/2025:  The patient returns to clinic today for follow up of back pain. He reports worsened low back pain that radiates into the posterior aspect of his left leg to under his foot. He denies any right leg pain. His pain is worse with prolonged walking and activity. He is performing a home exercise routine. He is taking Gabapentin. He denies any other health changes. His pain today is 7/10.    Interval History 2/13/2025:  The patient returns to clinic today for follow up of back pain. He is s/p left SI joint injection on 1/30/2025. He reports limited relief. He continues to report left sided low back and buttock pain. He denies any radicular leg pain at this time. His pain is worse with prolonged walking and activity. He denies any other health  changes. His pain today is 6/10.    Interval History 1/15/2025:  The patient returns to clinic today for follow up of back pain. He is s/p bilateral L5/S1 TF JEAN MARIE on 12/23/2024. He reports 50% relief. He continues to report left sided low back and buttock pain. He denies any radicular leg pain. His pain is worse with prolonged sitting and walking. He does endorse morning stiffness. He denies any other health changes. His pain today is 7/10.    Interval History 12/3/2024:  The patient is here to discuss progressively worsening lower back pain. Last received his L5/S1 TF JEAN MARIE on 07/23/2024 so he thinks its time for another injection. Denies any motor weakness or loss of sensation. Recently had hernia repair on 11/18/2024.    Interval History 6/26/2023:  The patient is here to discuss worsened lower back and leg pain. I last saw him in September at which time he underwent bilateral L5/S1 TF JEAN MARIE on 9/26/22 with 90% relief for 8 months. He says that his pain was minimal during that time. He was able to work without pain disturbance. He has continues with PT exercises 3 days per week for the past 9 months. His pain has been worsening over the past month. He would like to repeat the procedure. He also says that his wife passed away since previous encounter due to long-standing history of sarcoidosis. He has understandably been upset about this. His pain today is 5/10.     Interval History 9/13/2022:  Renny is here to discuss worsened back and leg pain. The pain radiates from the lower back into the back of both legs to anterior shin and great toe. He has associated numbness. He is diabetic but states that he has not had major problems with his sugars recently. No recent trauma. He previously underwent right L5/S1 and S1 TF JEAN MARIE with 80% relief for about 2 months. During that time he was able to ambulate and work without limitation by pain. Today, he again reports severe pain which is affecting his daily activities. He has  been in PT exercises for the past 15 weeks without much benefit of symptoms. He has tried ice and heat without benefit. He continues to take Gabapentin which provides some benefit. His overall pain today is 8/10.    Interval History 6/7/2022:  The patient returns to clinic today for follow up of low back pain. He reports increased low back pain over the last two weeks. He reports low back pain that radiates into the posterior aspect of his right leg to the bottom of his foot. He denies any left leg pain. His pain is worse with prolonged standing and walking. He reports that previous TF JEAN MARIE in 2021 provided 80% relief. He did notice that it took 3-4 weeks to see full benefit. He continues to take Gabapentin. He continues to perform a home exercise routine as prescribed. He denies any weakness. He denies any other health changes. His pain today is 5/10.    Interval history 07/02/2021:  The patient presents for follow-up lower back pain and right leg radiculopathy.  He is status post repeat right-sided TF JEAN MARIE to L5/S1&S1.  He has difficulty quantifying pain relief but states that normally his leg pain is 100% resolved and this time pain persists.  He is not have any recent images.  His medication regimen includes gabapentin 300 mg states this is mildly beneficial or denies any adverse side effects of medication.  Denies any focal loss of bowel, bladder or saddle paresthesias concerning for cauda equina.    Interval history 05/27/2021:  The patient presents for follow-up of lower back pain and bilateral leg radiculopathy.  He has had prior ILESI and TFESI both with 80%  benefit lasting approximately 9 months and pain just now returning. Pain is worse to right side at this time.  He denies any new areas of pain or neurological changes.The patient denies myelopathic symptoms such as handwriting changes or difficulty with buttons/coins/keys. Denies perineal paresthesias, bowel/bladder dysfunction.    Interval History  9/1/2020:  The patient is here for follow up of back and leg pain.  He is now s/p L5/S1 IL JEAN MARIE with 80% of back pain.  However, he is having significant right leg pain, mainly down the buttock and posterior calf.  He had benefit with right L5/S1 and S1 TF JEAN MARIE in the past and would like to repeat.  He has associated numbness to right calf, worse with walking.  He does have some benefit with Gabapentin.  He had a recent Covid test for work which was negative. His pain today is 5/10.    Interval History 6/9/2020:  The patient is here for follow up of chronic lower back pain.  We have not seen the patient since last year.  He underwent an epidural at that time and says that his pain has been very mild until a few weeks ago.  He is having pain across lower back with radiation into the legs, mainly on the left.  He continues to take gabapentin with some benefit.  The pain bothers him the most with walking and activity.  He had pacemaker replacement on 5/6/20.  He is not currently on blood thinners.  His pain today is 5/10.    Interval History 7/30/2019:  The patient returns for follow up of back pain.  He is s/p repeat left then right L3,4,5 RFAs completed on 6/24/19 with about 80% relief.  His back pain is mild.  He has had increased leg pain recently, worse on the left side.  Previous leg pain was relieved with JEAN MARIE last year.  He would like to repeat this.  His leg pain bothers him mainly at night when trying to sleep.  He stopped Gabapentin when he was not having leg pain but recently restarted.  He is unable to take at night.  His pain today is 6/10.    Interval History 5/14/2019:  The patient returns for follow up.  He previously had benefit with JEAN MARIE for leg pain and RFAs for back pain.  He is having some back pain that has been worsening over the past couple of weeks.  He has significant pain in the morning.  He has some improvement when he moves around.  He says that cold air aggravates his pain.  He stopped  Gabapentin last year when his leg pain improved.  He is not having much leg pain now.  His pain today is 6/10.    Interval History 2/7/2019:  The patient presents for check up of chronic back pain.  He reports doing well since last visit.  He feels that last JEAN MARIE is still providing him benefit.  He has not had any leg pain.  He does still have back pain but states that it is tolerable.  His morning pain is much improved from RFAs last year.  He continues to work and is active.  His pain today is 5/10.    Interval History 12/6/2018:  The patient presents today for follow up.  He continues with pain to the lower back.  He is not having leg pain at this time.  He had some relief with RFAs with the past.  He stopped Gabapentin when his leg pain improved.  He takes Tylenol sparingly with some benefit.  His pain today is 5/10.  The patient denies any bowel or bladder incontinence or signs of saddle paresthesia.  The patient denies any major medical changes since last office visit.    Interval History 10/25/2018:  The patient returns for follow up of back and leg pain.  He is s/p L5/S1 IL JEAN MARIE on 10/11/18 with 80% pain relief for his legs.  He has had increased lower back pain over the past few days which he attributes to weather changes.  He describes it as aching.  He has not been stretching.  He does walk a lot for work.  He cannot take oral NSAIDs due to pacemaker placement.  His pain today is 8/10.     Interval History 9/11/2018:  The patient presents for procedure follow up appointment.  He is s/p left then right L3,4,5 RFA completed on 8/14/18 with 80% pain relief initially.  He has had a little more pain over the past week.  He is now having intermittent radiation into the back of both legs, left greater than right.  He previously had benefit with right sided TF JEAN MARIE.  He is still taking gabapentin.  His pain today is 5/10.    Interval History 7/25/2018:  The patient returns today for follow up of back pain.  He is  s/p bilateral L3,4,5 MBB on 7/5/18 with 100% relief for 2 days.  He previously had benefit with right TF JEAN MARIE for leg pain.  He has not had right leg pain since the epidural.  However, he is reporting lateral left leg pain that has progressed over the past couple of weeks.  He continues to be active and works.  His pain today is 5/10.    Interval History 6/20/2018:  The patient returns today for follow up of lower back and right leg pain.  He is s/p right L5 and S1 TF JEAN MARIE on 6/5/18 with 100% relief of right leg pain.  He has not had any leg pain since the procedure.  He continues to report pain across the lower back.  This is always worse first thing in the morning.  He states that this feels aching and throbbing in nature.  He did have recent lumbar CT scan.  He would like to discuss further procedures.  He continues to work and be active.  His pain today is 5/10.    Interval History 5/22/2018:  The patient returns for follow up of back pain.  He is having radiation down there back of the right leg to the posterior calf.  The back pain is most severe in the morning and he states that this feels like a stiffness.  This improves when he walks.  He has severe leg pain that is intermittent, mainly with activity.  This is his biggest complaint.  He did complete the medrol dose pack recently with limited improvement.  He had XRAYs which show severe loss of disc space at L5/S1.  He has not had a CT scan.  He is taking Gabapentin 300 mg at bedtime.  It is written BID but it makes him drowsy during the day.  He has some benefit with Aleve but has been told to avoid NSAIDs due to history of pacemaker placement.  His pain today is 5/10.     Initial encounter:    Renny Young presents to the clinic for the evaluation of lower back and right leg pain. The pain started two months ago and symptoms have been worsening.    Brief history:    Pain Description:    The pain is located in the lower back and right leg area in the L5/S1  distribution.      At BEST  5/10     At WORST  7/10 on the WORST day.      On average pain is rated as 5/10.     Today the pain is rated as 5/10    The pain is described as aching      Symptoms interfere with daily activity.     Exacerbating factors: Standing and Morning.      Mitigating factors medications.     Patient denies night fever/night sweats, urinary incontinence, bowel incontinence, significant weight loss, significant motor weakness and loss of sensations.  Patient denies any suicidal or homicidal ideations    Pain Medications:  Current:  OTC Tylenol PRN  Gabapentin 300 mg BID    Tried in Past:  NSAIDs - Aleve  TCA -Never  SNRI -Never  Anti-convulsants - Gabapentin   Muscle Relaxants -Never  Opioids-Never    Physical Therapy/Home Exercise: yes, 2-3 times per week (>6 weeks within the last 6 months)     report:  Reviewed and consistent with medication use as prescribed.    Pain Procedures:   6/5/18 Right L5 and S1 TF JEAN MARIE- significant relief  7/5/18 Bilateral L3,4,5 MBB- 100% relief for 2 days  7/31/18 Left L3,4,5 RFA- 80% relief  8/14/18 Right L3,4,5 RFA- 80% relief  10/11/18 L5/S1 IL JEAN MARIE- 80% relief  6/11/19 Left L3,4,5 RFA- 80% relief  6/25/19 Right L3,4,5 RFA- 80% relief  8/13/19 L5/S1 IL JEAN MARIE- 90% relief for 9 months  6/16/20 L5/S1 IL JEAN MARIE- 80% relief of back pain  6/15/2021- Right L5/S1 and S1 TF JEAN MARIE  6/20/22 Right L5 and S1 TF JEAN MARIE- 80% relief for 2 months  9/26/22 Bilateral L5/S1 TF JEAN MARIE- 90% relief for 9 months  7/23/24 Bialteral L5/S1 TF JEAN MARIE- 80% relief  12/23/2024- Bilateral L5/S1 TF JEAN MARIE  1/30/2025- Left SI joint injection  4/1/2025 - left L5/S1 and S1 TFESI - 80% relief of leg pain    Chiropractor -never  Acupuncture - never  TENS unit -never  Spinal decompression -never  Joint replacement -never    Imaging: PACEMAKER IN PLACE    CT Lumbar Spine 7/9/2021:  COMPARISON:  Lumbar spine CT May 28, 2018     FINDINGS:  Mild levocurvature of the lumbar spine.  No listhesis.  There is no acute fracture.   The vertebral bodies are normal in height without compression fractures. Posterior elements are intact. There is unchanged severe disc height loss at the L5-S1 level with associated sclerosis and subchondral cystic change within the endplates.  Mild atherosclerotic calcification within the abdominal aorta which is not aneurysmal.  Otherwise, no soft tissue abnormality identified.     T12-L1: The disc is normal in configuration.  There is no facet arthropathy.  There is no neural foraminal stenosis.  There is no spinal canal stenosis.     L1-L2: The disc is normal in configuration.  There is no facet arthropathy.  There is no neuroforaminal stenosis.  There is no spinal canal stenosis.     L2-L3: The disc is normal in configuration.  There is no facet arthropathy.  There is no neuroforaminal stenosis.  There is no spinal canal stenosis.     L3-L4: Minimal diffuse disc bulge.  Mild bilateral facet arthropathy.  There is no neuroforaminal stenosis.  There is no spinal canal stenosis.     L4-L5: There is a diffuse disc bulge.  Mild-to-moderate bilateral facet arthropathy.  There is no neuroforaminal stenosis.  There is no spinal canal stenosis.     L5-S1: There is a circumferential disc osteophyte complex secondary to the severe degenerative disc disease at this level.  Mild to moderate bilateral facet arthropathy.  Unchanged mild bilateral neural foraminal stenosis secondary to diffuse disc osteophyte complex as well as the facet arthropathy.  There is no spinal canal stenosis.     Impression:     Inferior lumbar spine degenerative changes worst at L5-S1 which result in mild bilateral neural foraminal stenosis at this level.  Overall, findings are not significantly changed compared to prior study from May 2018.        Past Medical History:   Diagnosis Date    Colon polyp     Diabetes mellitus, type 2     Full dentures     Hyperlipidemia     Hypertension     Pacemaker     Pacemaker at end of battery life 05/06/2020     Smoker 06/10/2015    Offered cessation program and declined    Wears prescription eyeglasses      Past Surgical History:   Procedure Laterality Date    COLONOSCOPY N/A 2/13/2017    Procedure: COLONOSCOPY;  Surgeon: NILDA Juares MD;  Location: [facility] ENDO (4TH FLR);  Service: Endoscopy;  Laterality: N/A;  Do not cancel this order. Patient has Pacemaker in place.     COLONOSCOPY, SCREENING, HIGH RISK PATIENT N/A 1/9/2025    Procedure: COLONOSCOPY, SCREENING, HIGH RISK PATIENT;  Surgeon: Gene Duran MD;  Location: [facility] ENDO (4TH FLR);  Service: Endoscopy;  Laterality: N/A;  ref by / [facility] mailed-RB  12/31-pre call complete-tb    EPIDURAL STEROID INJECTION N/A 8/13/2019    Procedure: INJECTION, STEROID, EPIDURAL IL, L5/S1;  Surgeon: Jouse Paredes MD;  Location: [facility] PAIN MGT;  Service: Pain Management;  Laterality: N/A;  IL JEAN MARIE L5/S1    EPIDURAL STEROID INJECTION N/A 6/16/2020    Procedure: INJECTION, STEROID, EPIDURAL, L5-S1 IL add on @ 2 ok by  Asia;  Surgeon: Josue Paredes MD;  Location: [facility] PAIN MGT;  Service: Pain Management;  Laterality: N/A;    HERNIA REPAIR      INJECTION OF ANESTHETIC AGENT AROUND NERVE Bilateral 7/5/2018    Procedure: BLOCK, NERVE;  Surgeon: Krystal Mtz MD;  Location: [facility] PAIN MGT;  Service: Pain Management;  Laterality: Bilateral;  Lumbar Bilateral L3-L4-L5 Medial Branch Block    INJECTION, SACROILIAC JOINT Left 1/30/2025    Procedure: INJECTION,SACROILIAC JOINT LEFT;  Surgeon: Krystal Mtz MD;  Location: [facility] PAIN MGT;  Service: Pain Management;  Laterality: Left;  2 WK F/U ROSCOE    RADIOFREQUENCY ABLATION Left 6/11/2019    Procedure: RADIOFREQUENCY ABLATION, L3-L4-L5 MEDIAL BRANCH   1 OF 2;  Surgeon: Josue Paredes MD;  Location: [facility] PAIN MGT;  Service: Pain Management;  Laterality: Left;    RADIOFREQUENCY ABLATION Right 6/25/2019    Procedure: RADIOFREQUENCY ABLATION, L3-L4-L5 MEDIAL BRANCH JING 2 OF 2;  Surgeon: Josue Paredes MD;  Location:  [facility] PAIN MGT;  Service: Pain Management;  Laterality: Right;    REPAIR, HERNIA, INGUINAL, WITHOUT HISTORY OF PRIOR REPAIR, AGE 5 YEARS OR OLDER Left 11/18/2024    Procedure: REPAIR, HERNIA, INGUINAL, WITHOUT HISTORY OF PRIOR REPAIR, AGE 5 YEARS OR OLDER;  Surgeon: Titus Craft MD;  Location: [facility] OR;  Service: General;  Laterality: Left;    REPLACEMENT OF PACEMAKER GENERATOR Left 5/6/2020    Procedure: REPLACEMENT, PULSE GENERATOR, CARDIAC PACEMAKER;  Surgeon: Bradford Spence MD;  Location: [facility] EP LAB;  Service: Cardiology;  Laterality: Left;  EOL/LEAD Mlfx, Gen Change, Poss RA lead rev, SJM, MAC, GP, 3 PREP    REVISION OF PROCEDURE INVOLVING PACEMAKER LEAD Left 5/6/2020    Procedure: REVISION, ELECTRODE LEAD, CARDIAC PACEMAKER;  Surgeon: Bradford Spence MD;  Location: [facility] EP LAB;  Service: Cardiology;  Laterality: Left;    TRANSFORAMINAL EPIDURAL INJECTION OF STEROID Right 6/5/2018    Procedure: INJECTION-STEROID-EPIDURAL-TRANSFORAMINAL;  Surgeon: Josue Paredes MD;  Location: [facility] PAIN MGT;  Service: Pain Management;  Laterality: Right;  LUMBAR RIGHT L5 AND S1 TRANSFORAMINL JEAN MARIE  [id number]    W/ SEDATION     TRANSFORAMINAL EPIDURAL INJECTION OF STEROID Right 9/10/2020    Procedure: INJECTION, STEROID, EPIDURAL, TRANSFORAMINAL APPROACH, L5-S1 AND S1;  Surgeon: Josue Paredes MD;  Location: [facility] PAIN MGT;  Service: Pain Management;  Laterality: Right;    TRANSFORAMINAL EPIDURAL INJECTION OF STEROID Right 6/15/2021    Procedure: INJECTION, STEROID, EPIDURAL, TRANSFORAMINAL APPROACH L5-S1 AND S1 need consent;  Surgeon: Josue Paredes MD;  Location: [facility] PAIN MGT;  Service: Pain Management;  Laterality: Right;    TRANSFORAMINAL EPIDURAL INJECTION OF STEROID Right 6/20/2022    Procedure: INJECTION, STEROID, EPIDURAL, TRANSFORAMINAL APPROACH, RIGHT L5-S1 AND S1 CONTRAST;  Surgeon: Krystal Mtz MD;  Location: [facility] PAIN MGT;  Service: Pain Management;  Laterality: Right;    TRANSFORAMINAL EPIDURAL INJECTION OF  STEROID Bilateral 9/26/2022    Procedure: INJECTION, STEROID, EPIDURAL, TRANSFORAMINAL APPROACH, BILATERAL L5-S1 CONTRAST;  Surgeon: Krystal Mtz MD;  Location: [facility] PAIN MGT;  Service: Pain Management;  Laterality: Bilateral;    TRANSFORAMINAL EPIDURAL INJECTION OF STEROID Bilateral 7/13/2023    Procedure: INJECTION, STEROID, EPIDURAL, TRANSFORAMINAL APPROACH, L5-S1 BILATERAL;  Surgeon: Krystal Mtz MD;  Location: [facility] PAIN MGT;  Service: Pain Management;  Laterality: Bilateral;    TRANSFORAMINAL EPIDURAL INJECTION OF STEROID Bilateral 12/23/2024    Procedure: LUMBAR TRANSFORAMINAL BILATERAL L5/S1 *ASPIRIN OTC8 HOLD FOR 5 DAYS *HERNIA SX CLEARANCE IN CHART*;  Surgeon: Krystal Mtz MD;  Location: [facility] PAIN MGT;  Service: Pain Management;  Laterality: Bilateral;    TRANSFORAMINAL EPIDURAL INJECTION OF STEROID Left 4/1/2025    Procedure: LUMBAR TRANSFORAMINAL LEFT L5/S1 AND S1 *ASPIRIN OTC* HOLD FOR 5 DAYS;  Surgeon: Krystal Mtz MD;  Location: [facility] PAIN MGT;  Service: Pain Management;  Laterality: Left;  2 WK F/U VENICE     Social History     Socioeconomic History    Marital status:     Number of children: 3   Occupational History    Occupation:    Tobacco Use    Smoking status: Every Day     Current packs/day: 0.75     Average packs/day: 0.8 packs/day for 47.4 years (35.5 ttl pk-yrs)     Types: Cigarettes     Start date: 1978     Passive exposure: Never    Smokeless tobacco: Never   Substance and Sexual Activity    Alcohol use: Yes     Comment: Beer- Socially    Drug use: No    Sexual activity: Yes     Family History   Problem Relation Name Age of Onset    Diabetes Mother      Diabetes Father      Kidney disease Father      Melanoma Neg Hx         Review of patient's allergies indicates:  No Known Allergies    Current Outpatient Medications   Medication Sig    acetaminophen (TYLENOL) 500 MG tablet Take 1,000 mg by mouth every 6 (six) hours as needed for Pain.    amLODIPine (NORVASC) 5 MG tablet Take 1  "tablet (5 mg total) by mouth once daily.    aspirin (ECOTRIN) 81 MG EC tablet Take 81 mg by mouth once daily.    atorvastatin (LIPITOR) 20 MG tablet Take 1 tablet (20 mg total) by mouth once daily.    gabapentin (NEURONTIN) 300 MG capsule Take 1 capsule by mouth twice daily    hydrOXYzine HCL (ATARAX) 10 MG Tab TAKE 1 TABLET BY MOUTH NIGHTLY AS NEEDED FOR ITCHING    losartan-hydrochlorothiazide 100-12.5 mg (HYZAAR) 100-12.5 mg Tab Take 1 tablet by mouth once daily.    metFORMIN (GLUCOPHAGE) 500 MG tablet Take 1 tablet (500 mg total) by mouth daily with breakfast.    oxyCODONE-acetaminophen (PERCOCET) 5-325 mg per tablet Take 1 tablet by mouth every 4 (four) hours as needed for Pain.    urea (CARMOL) 40 % Crea Apply topically once daily.     No current facility-administered medications for this visit.       REVIEW OF SYSTEMS:    GENERAL:  No weight loss, malaise or fevers.  HEENT:   No recent changes in vision or hearing  NECK:  Negative for lumps, no difficulty with swallowing.  RESPIRATORY:  Negative for cough, wheezing or shortness of breath, patient denies any recent URI.  CARDIOVASCULAR:  Negative for chest pain, leg swelling or palpitations. Pacemaker for SSS.  GI:  Negative for abdominal discomfort, blood in stools or black stools or change in bowel habits.  MUSCULOSKELETAL:  See HPI.  SKIN:  Negative for lesions, rash, and itching.  PSYCH:  No mood disorder or recent psychosocial stressors.  Patients sleep is not disturbed secondary to pain.  HEMATOLOGY/LYMPHOLOGY:  Negative for prolonged bleeding, bruising easily or swollen nodes.  Patient is not currently taking any anti-coagulants  ENDO: DM2 on metformin  NEURO:   No history of headaches, syncope, paralysis, seizures or tremors.  All other reviewed and negative other than HPI.    OBJECTIVE:    /74 (BP Location: Right arm, Patient Position: Sitting)   Pulse 71   Resp 19   Ht 5' 7" (1.702 m)   Wt 69.2 kg (152 lb 8.9 oz)   SpO2 100%   BMI 23.89 " kg/m²     PHYSICAL EXAMINATION:     GENERAL: Well appearing, in no acute distress, alert and oriented x3.  PSYCH:  Mood and affect appropriate.  SKIN: Skin color, texture, turgor normal, no rashes or lesions.  HEAD/FACE:  Normocephalic, atraumatic. Cranial nerves grossly intact.  CV: RRR with palpation of the radial artery.  PULM: No evidence of respiratory difficulty, symmetric chest rise.  BACK: Straight leg raising in the sitting position is negative for radicular pain.  There is pain with palpation over the facet joints of the lumbar spine bilaterally. Limited ROM with pain on extension. Mildly positive facet loading bilaterally. No pain to palpation to bilateral SI joints.   EXTREMITIES: No deformities, edema, or skin discoloration. Good capillary refill.  MUSCULOSKELETAL: Hip, Knee provocative maneuvers are negative. Bilateral upper and lower extremity strength is normal and symmetric.  No atrophy or tone abnormalities are noted.   NEURO: No loss of sensation noted.    GAIT: Antalgic- ambulates without assistance.      ASSESSMENT: 65 y.o. year old male with lower back pain, consistent with the following diagnoses:    Encounter Diagnoses   Name Primary?    Lumbar spondylosis Yes    Facet arthropathy     Facet arthropathy, lumbosacral     Chronic pain syndrome     Lumbar radiculopathy     Degeneration of intervertebral disc of lumbar region with discogenic back pain and lower extremity pain        PLAN:     - Previous imaging was reviewed and discussed with the patient today. Labs reviewed.     - He is s/p left L5/S1 and S1 TF JEAN MARIE with 80% relief of leg pain    - Patient's pain is axial in nature without radiation. He has had previous good benefit from bilateral lumbar RFA. Procedure order placed for bilateral L3, L4, L5 MBB x 2 with progression to RFA at these levels if significant short-term relief from MBB. Patient will keep a pain diary. He has participated in a home exercise program for over 6 weeks within  the last 6 months with limited benefit.     - Continue current medications.     - The patient will continue a home exercise routine to help with pain and strengthening.    - RTC 6 weeks after RFA if completed.     The above plan and management options were discussed at length with patient. Patient is in agreement with the above and verbalized understanding.     Caitlin Galicia NP   05/14/2025         spouse

## 2025-07-24 ENCOUNTER — APPOINTMENT (OUTPATIENT)
Dept: ORTHOPEDIC SURGERY | Facility: CLINIC | Age: 75
End: 2025-07-24
Payer: COMMERCIAL

## 2025-07-24 DIAGNOSIS — S46.011D STRAIN OF MUSCLE(S) AND TENDON(S) OF THE ROTATOR CUFF OF RIGHT SHOULDER, SUBSEQUENT ENCOUNTER: ICD-10-CM

## 2025-07-24 PROCEDURE — 99213 OFFICE O/P EST LOW 20 MIN: CPT

## 2025-07-29 ENCOUNTER — APPOINTMENT (OUTPATIENT)
Dept: PAIN MANAGEMENT | Facility: CLINIC | Age: 75
End: 2025-07-29